# Patient Record
Sex: MALE | Race: WHITE | NOT HISPANIC OR LATINO | Employment: OTHER | URBAN - METROPOLITAN AREA
[De-identification: names, ages, dates, MRNs, and addresses within clinical notes are randomized per-mention and may not be internally consistent; named-entity substitution may affect disease eponyms.]

---

## 2023-04-24 ENCOUNTER — TELEPHONE (OUTPATIENT)
Dept: PRIMARY CARE | Facility: CLINIC | Age: 68
End: 2023-04-24
Payer: MEDICARE

## 2023-04-24 DIAGNOSIS — I10 PRIMARY HYPERTENSION: Primary | ICD-10-CM

## 2023-04-24 DIAGNOSIS — G47.00 INSOMNIA, UNSPECIFIED TYPE: ICD-10-CM

## 2023-04-24 RX ORDER — AMLODIPINE BESYLATE 10 MG/1
10 TABLET ORAL DAILY
Qty: 90 TABLET | Refills: 3 | Status: SHIPPED | OUTPATIENT
Start: 2023-04-24 | End: 2024-04-11

## 2023-04-24 RX ORDER — TRAZODONE HYDROCHLORIDE 50 MG/1
50 TABLET ORAL NIGHTLY
COMMUNITY
Start: 2019-12-16 | End: 2023-04-24 | Stop reason: SDUPTHER

## 2023-04-24 RX ORDER — AMLODIPINE BESYLATE 10 MG/1
10 TABLET ORAL DAILY
COMMUNITY
Start: 2014-07-18 | End: 2023-04-24 | Stop reason: SDUPTHER

## 2023-04-24 RX ORDER — TRAZODONE HYDROCHLORIDE 50 MG/1
50 TABLET ORAL NIGHTLY
Qty: 90 TABLET | Refills: 3 | Status: SHIPPED | OUTPATIENT
Start: 2023-04-24 | End: 2024-04-11

## 2023-07-17 PROBLEM — M25.512 LEFT SHOULDER PAIN: Status: ACTIVE | Noted: 2023-07-17

## 2023-07-17 PROBLEM — N40.0 PROSTATISM: Status: ACTIVE | Noted: 2023-07-17

## 2023-07-17 PROBLEM — R06.81 APNEA: Status: ACTIVE | Noted: 2023-07-17

## 2023-07-17 PROBLEM — M19.031 ARTHRITIS OF RIGHT WRIST: Status: ACTIVE | Noted: 2023-07-17

## 2023-07-17 PROBLEM — S29.012A STRAIN OF LATISSIMUS DORSI MUSCLE: Status: ACTIVE | Noted: 2023-07-17

## 2023-07-17 PROBLEM — R07.89 ATYPICAL CHEST PAIN: Status: ACTIVE | Noted: 2023-03-20

## 2023-07-17 PROBLEM — E55.9 VITAMIN D DEFICIENCY: Status: ACTIVE | Noted: 2023-07-17

## 2023-07-17 PROBLEM — S46.912A LEFT SHOULDER STRAIN: Status: ACTIVE | Noted: 2023-07-17

## 2023-07-17 PROBLEM — E78.5 HYPERLIPIDEMIA: Status: ACTIVE | Noted: 2023-07-17

## 2023-07-17 PROBLEM — I10 HTN (HYPERTENSION): Status: ACTIVE | Noted: 2023-07-17

## 2023-07-17 PROBLEM — M25.569 KNEE PAIN: Status: ACTIVE | Noted: 2023-07-17

## 2023-07-17 PROBLEM — R42 POSTURAL DIZZINESS WITH PRESYNCOPE: Status: ACTIVE | Noted: 2023-07-17

## 2023-07-17 PROBLEM — F34.1 PRIMARY DYSTHYMIA: Status: ACTIVE | Noted: 2023-07-17

## 2023-07-17 PROBLEM — S76.219A GROIN STRAIN: Status: ACTIVE | Noted: 2023-07-17

## 2023-07-17 PROBLEM — E03.9 HYPOTHYROIDISM: Status: ACTIVE | Noted: 2023-07-17

## 2023-07-17 PROBLEM — E78.5 HLD (HYPERLIPIDEMIA): Status: ACTIVE | Noted: 2023-07-17

## 2023-07-17 PROBLEM — R53.83 FATIGUE: Status: ACTIVE | Noted: 2023-07-17

## 2023-07-17 PROBLEM — S39.91XA INJURY OF GROIN: Status: ACTIVE | Noted: 2023-07-17

## 2023-07-17 PROBLEM — R55 POSTURAL DIZZINESS WITH PRESYNCOPE: Status: ACTIVE | Noted: 2023-07-17

## 2023-07-17 PROBLEM — I10 HYPERTENSION: Status: ACTIVE | Noted: 2023-07-17

## 2023-07-17 PROBLEM — R00.1 SYMPTOMATIC SINUS BRADYCARDIA: Status: ACTIVE | Noted: 2023-07-17

## 2023-07-17 PROBLEM — J38.2 VOCAL CORD NODULE: Status: ACTIVE | Noted: 2023-07-17

## 2023-07-17 PROBLEM — S89.90XA KNEE INJURY: Status: ACTIVE | Noted: 2023-07-17

## 2023-07-17 PROBLEM — G47.00 INSOMNIA: Status: ACTIVE | Noted: 2023-07-17

## 2023-07-17 PROBLEM — R06.81 WITNESSED EPISODE OF APNEA: Status: ACTIVE | Noted: 2023-07-17

## 2023-12-12 ENCOUNTER — APPOINTMENT (OUTPATIENT)
Dept: PRIMARY CARE | Facility: CLINIC | Age: 68
End: 2023-12-12
Payer: MEDICARE

## 2023-12-31 DIAGNOSIS — I10 ESSENTIAL (PRIMARY) HYPERTENSION: ICD-10-CM

## 2023-12-31 DIAGNOSIS — E78.5 HYPERLIPIDEMIA, UNSPECIFIED: ICD-10-CM

## 2024-01-03 RX ORDER — SIMVASTATIN 20 MG/1
20 TABLET, FILM COATED ORAL DAILY
Qty: 90 TABLET | Refills: 3 | Status: SHIPPED | OUTPATIENT
Start: 2024-01-03 | End: 2024-05-20 | Stop reason: SDUPTHER

## 2024-01-03 RX ORDER — LOSARTAN POTASSIUM 100 MG/1
100 TABLET ORAL DAILY
Qty: 90 TABLET | Refills: 3 | Status: SHIPPED | OUTPATIENT
Start: 2024-01-03

## 2024-01-14 DIAGNOSIS — F34.1 DYSTHYMIC DISORDER: ICD-10-CM

## 2024-01-15 ENCOUNTER — TELEPHONE (OUTPATIENT)
Dept: PRIMARY CARE | Facility: CLINIC | Age: 69
End: 2024-01-15
Payer: MEDICARE

## 2024-01-15 RX ORDER — PAROXETINE HYDROCHLORIDE 20 MG/1
20 TABLET, FILM COATED ORAL DAILY
Qty: 90 TABLET | Refills: 3 | Status: SHIPPED | OUTPATIENT
Start: 2024-01-15

## 2024-02-15 ENCOUNTER — OFFICE VISIT (OUTPATIENT)
Dept: PRIMARY CARE | Facility: CLINIC | Age: 69
End: 2024-02-15
Payer: MEDICARE

## 2024-02-15 VITALS
BODY MASS INDEX: 28.99 KG/M2 | HEIGHT: 72 IN | HEART RATE: 63 BPM | DIASTOLIC BLOOD PRESSURE: 90 MMHG | RESPIRATION RATE: 14 BRPM | SYSTOLIC BLOOD PRESSURE: 160 MMHG | OXYGEN SATURATION: 98 % | WEIGHT: 214 LBS

## 2024-02-15 DIAGNOSIS — Z23 NEED FOR VACCINATION: ICD-10-CM

## 2024-02-15 DIAGNOSIS — Z00.00 ROUTINE GENERAL MEDICAL EXAMINATION AT HEALTH CARE FACILITY: ICD-10-CM

## 2024-02-15 DIAGNOSIS — N40.1 BENIGN PROSTATIC HYPERPLASIA WITH NOCTURIA: ICD-10-CM

## 2024-02-15 DIAGNOSIS — E78.5 HYPERLIPIDEMIA, UNSPECIFIED HYPERLIPIDEMIA TYPE: ICD-10-CM

## 2024-02-15 DIAGNOSIS — I10 PRIMARY HYPERTENSION: ICD-10-CM

## 2024-02-15 DIAGNOSIS — R35.1 BENIGN PROSTATIC HYPERPLASIA WITH NOCTURIA: ICD-10-CM

## 2024-02-15 DIAGNOSIS — E55.9 VITAMIN D DEFICIENCY: ICD-10-CM

## 2024-02-15 DIAGNOSIS — R19.4 ALTERED BOWEL HABITS: ICD-10-CM

## 2024-02-15 DIAGNOSIS — E03.9 ACQUIRED HYPOTHYROIDISM: ICD-10-CM

## 2024-02-15 DIAGNOSIS — Z00.00 MEDICARE ANNUAL WELLNESS VISIT, SUBSEQUENT: Primary | ICD-10-CM

## 2024-02-15 DIAGNOSIS — R35.1 NOCTURIA: ICD-10-CM

## 2024-02-15 PROCEDURE — 1170F FXNL STATUS ASSESSED: CPT | Performed by: INTERNAL MEDICINE

## 2024-02-15 PROCEDURE — G0439 PPPS, SUBSEQ VISIT: HCPCS | Performed by: INTERNAL MEDICINE

## 2024-02-15 PROCEDURE — 99213 OFFICE O/P EST LOW 20 MIN: CPT | Performed by: INTERNAL MEDICINE

## 2024-02-15 PROCEDURE — 3079F DIAST BP 80-89 MM HG: CPT | Performed by: INTERNAL MEDICINE

## 2024-02-15 PROCEDURE — 90677 PCV20 VACCINE IM: CPT | Performed by: INTERNAL MEDICINE

## 2024-02-15 PROCEDURE — 1036F TOBACCO NON-USER: CPT | Performed by: INTERNAL MEDICINE

## 2024-02-15 PROCEDURE — 1124F ACP DISCUSS-NO DSCNMKR DOCD: CPT | Performed by: INTERNAL MEDICINE

## 2024-02-15 PROCEDURE — 3077F SYST BP >= 140 MM HG: CPT | Performed by: INTERNAL MEDICINE

## 2024-02-15 PROCEDURE — G0009 ADMIN PNEUMOCOCCAL VACCINE: HCPCS | Performed by: INTERNAL MEDICINE

## 2024-02-15 PROCEDURE — 1159F MED LIST DOCD IN RCRD: CPT | Performed by: INTERNAL MEDICINE

## 2024-02-15 RX ORDER — TAMSULOSIN HYDROCHLORIDE 0.4 MG/1
0.4 CAPSULE ORAL DAILY
Qty: 90 CAPSULE | Refills: 3 | Status: SHIPPED | OUTPATIENT
Start: 2024-02-15 | End: 2025-02-14

## 2024-02-15 RX ORDER — HYDROCHLOROTHIAZIDE 12.5 MG/1
12.5 TABLET ORAL DAILY
Qty: 90 TABLET | Refills: 3 | Status: SHIPPED | OUTPATIENT
Start: 2024-02-15 | End: 2025-02-14

## 2024-02-15 ASSESSMENT — ENCOUNTER SYMPTOMS
DIARRHEA: 0
JOINT SWELLING: 0
MYALGIAS: 0
NAUSEA: 0
FEVER: 0
CHILLS: 0
VOMITING: 0
COUGH: 0
DIAPHORESIS: 0
FREQUENCY: 1
SHORTNESS OF BREATH: 0
CONSTIPATION: 0

## 2024-02-15 ASSESSMENT — ACTIVITIES OF DAILY LIVING (ADL)
DOING_HOUSEWORK: INDEPENDENT
DRESSING: INDEPENDENT
BATHING: INDEPENDENT
GROCERY_SHOPPING: INDEPENDENT
TAKING_MEDICATION: INDEPENDENT
MANAGING_FINANCES: INDEPENDENT

## 2024-02-15 ASSESSMENT — PATIENT HEALTH QUESTIONNAIRE - PHQ9
1. LITTLE INTEREST OR PLEASURE IN DOING THINGS: NOT AT ALL
2. FEELING DOWN, DEPRESSED OR HOPELESS: NOT AT ALL
SUM OF ALL RESPONSES TO PHQ9 QUESTIONS 1 AND 2: 0

## 2024-02-15 NOTE — PATIENT INSTRUCTIONS
FASTING LABS ARE ORDERED FOR YOU TO INCLUDE PROSTATE, THYROID.  WAIT FOR 1 MONTH AND GET AFTER YOU'VE BEEN TAKING THE NEW WATER PILL IN THE MORNING    2.  REFERRAL TO GI TO DISCUSS COLONOSCOPY EARLIER SINCE YOU ARE HAVING SOME ALTERED BOWEL HABIT.  COLONOSCOPY FROM 2020 WAS BENIGN    3.  THE HOARSENESS OF VOICE IS USUALLY DUE TO VIRAL INFECTION, AS LONG AS IT IS RESOLVING, THEN  NO FURTHER WORKUP IS REQUIRED.    4.  RECOMMEND START HCTZ 12.5 MG DAILY - LOW DOSE - TAKE IN THE MORNING TO HELP BETTER CONTROL BLOOD PRESSURE.    5.  RECOMMEND START TAMSULOSIN 0.4 MG NIGHTLY TO HELP EASE UP PROSTATE AND RELIEVE NIGHT URINATION.  IF THIS MEDICATION DOESN'T HELP, OR IF PSA LEVEL IS ELEVATED, THEN I WILL GET YOU TO SEE A UROLOGIST FOR A CLOSER CHECK    6.  FOLLOW UP 3-4 MONTHS BP CHECK    7.  PREVNAR -20 PNEUMONIA IMMUNIZATION IS ADMINISTERED TO YOU TODAY TO GET AT LOCAL PHARMACY

## 2024-02-15 NOTE — PROGRESS NOTES
Subjective   Reason for Visit: Gen Govea is an 68 y.o. male here for a Medicare Wellness visit.    WOULD LIKE REFERRAL FOR COLONOSCOPY, PROSTATE CHECK ,THYROID     Past Medical, Surgical, and Family History reviewed and updated in chart.    Reviewed all medications by prescribing practitioner or clinical pharmacist (such as prescriptions, OTCs, herbal therapies and supplements) and documented in the medical record.    HPI  HAVING SOME ISSUES WITH PROSTATE URINATE 5 TIMES A NIGHT, NEVER STARTED TAMSULOSIN    FOR OVER A MONTH JANUARY HAD HOARSENESS OF VOICE, BETTER NOW, HAVING A LOT OF PHLEGM    SAW DR. EARL BEFORE, HAD CT CORONARY ART CALCIUM SCORE 88    TAKING BP MEDS REGULARLY     NO OTHER SYMPTOMS OR COMPLAINTS    BOWELS HABITS HAVE CHANGED, CONCERNED ABOUT COLON CANCER, WANT COLONOSCOPY NOW EVEN THOUGH LAST WAS 2020 WITH BENIGN PATH, AND REC WAS FOR 2025    Patient Care Team:  Clinton Padilla MD as PCP - General (Internal Medicine)  Clinton Padilla MD as PCP - [employer] PCP     Review of Systems   Constitutional:  Negative for chills, diaphoresis and fever.   Respiratory:  Negative for cough and shortness of breath.    Cardiovascular:  Negative for chest pain and leg swelling.   Gastrointestinal:  Negative for constipation, diarrhea, nausea and vomiting.   Genitourinary:  Positive for frequency.   Musculoskeletal:  Negative for joint swelling and myalgias.       Objective   Vitals:  /90   Pulse 63   Resp 14   Ht 1.829 m (6')   Wt 97.1 kg (214 lb)   SpO2 98%   BMI 29.02 kg/m²       Physical Exam  Vitals reviewed.   Constitutional:       General: He is not in acute distress.     Appearance: He is not ill-appearing.   Cardiovascular:      Rate and Rhythm: Normal rate and regular rhythm.      Pulses: Normal pulses.      Heart sounds:      No gallop.   Pulmonary:      Breath sounds: Normal breath sounds. No wheezing, rhonchi or rales.   Abdominal:      General: Abdomen is  flat. Bowel sounds are normal.      Palpations: Abdomen is soft.      Tenderness: There is no guarding or rebound.   Musculoskeletal:      Right lower leg: No edema.      Left lower leg: No edema.         Assessment/Plan   Problem List Items Addressed This Visit       Hyperlipidemia    Relevant Orders    Vitamin B12    Lipid Panel    Comprehensive Metabolic Panel    CBC    HTN (hypertension)    Relevant Medications    hydroCHLOROthiazide (HYDRODiuril) 12.5 mg tablet    Other Relevant Orders    Protein, Urine Random    Hypothyroidism    Relevant Orders    TSH with reflex to Free T4 if abnormal    Vitamin D deficiency    Relevant Orders    Vitamin D 25-Hydroxy,Total (for eval of Vitamin D levels)    Medicare annual wellness visit, subsequent - Primary     Other Visit Diagnoses       Benign prostatic hyperplasia with nocturia        Relevant Medications    tamsulosin (Flomax) 0.4 mg 24 hr capsule    Nocturia        Relevant Orders    Prostate Specific Antigen, Screen    Need for vaccination        Relevant Medications    zoster vaccine-recombinant adjuvanted (Shingrix) 50 mcg/0.5 mL vaccine    Other Relevant Orders    Pneumococcal conjugate vaccine 20-valent IM (Completed)    Routine general medical examination at health care facility        Altered bowel habits        Relevant Orders    Referral to Gastroenterology          Patient Instructions    FASTING LABS ARE ORDERED FOR YOU TO INCLUDE PROSTATE, THYROID.  WAIT FOR 1 MONTH AND GET AFTER YOU'VE BEEN TAKING THE NEW WATER PILL IN THE MORNING    2.  REFERRAL TO GI TO DISCUSS COLONOSCOPY EARLIER SINCE YOU ARE HAVING SOME ALTERED BOWEL HABIT.  COLONOSCOPY FROM 2020 WAS BENIGN    3.  THE HOARSENESS OF VOICE IS USUALLY DUE TO VIRAL INFECTION, AS LONG AS IT IS RESOLVING, THEN  NO FURTHER WORKUP IS REQUIRED.    4.  RECOMMEND START HCTZ 12.5 MG DAILY - LOW DOSE - TAKE IN THE MORNING TO HELP BETTER CONTROL BLOOD PRESSURE.    5.  RECOMMEND START TAMSULOSIN 0.4 MG NIGHTLY TO  HELP EASE UP PROSTATE AND RELIEVE NIGHT URINATION.  IF THIS MEDICATION DOESN'T HELP, OR IF PSA LEVEL IS ELEVATED, THEN I WILL GET YOU TO SEE A UROLOGIST FOR A CLOSER CHECK    6.  FOLLOW UP 3-4 MONTHS BP CHECK    7.  PREVNAR -20 PNEUMONIA IMMUNIZATION IS ADMINISTERED TO YOU TODAY TO GET AT LOCAL PHARMACY

## 2024-03-12 DIAGNOSIS — E03.9 HYPOTHYROIDISM, UNSPECIFIED: ICD-10-CM

## 2024-03-12 RX ORDER — LEVOTHYROXINE SODIUM 125 UG/1
125 TABLET ORAL DAILY
Qty: 90 TABLET | Refills: 1 | Status: SHIPPED | OUTPATIENT
Start: 2024-03-12

## 2024-03-16 ENCOUNTER — LAB (OUTPATIENT)
Dept: LAB | Facility: LAB | Age: 69
End: 2024-03-16
Payer: MEDICARE

## 2024-03-16 DIAGNOSIS — E78.5 HYPERLIPIDEMIA, UNSPECIFIED HYPERLIPIDEMIA TYPE: ICD-10-CM

## 2024-03-16 DIAGNOSIS — E03.9 ACQUIRED HYPOTHYROIDISM: ICD-10-CM

## 2024-03-16 DIAGNOSIS — R35.1 NOCTURIA: ICD-10-CM

## 2024-03-16 DIAGNOSIS — I10 PRIMARY HYPERTENSION: ICD-10-CM

## 2024-03-16 LAB
ALBUMIN SERPL BCP-MCNC: 4.2 G/DL (ref 3.4–5)
ALP SERPL-CCNC: 60 U/L (ref 33–136)
ALT SERPL W P-5'-P-CCNC: 20 U/L (ref 10–52)
ANION GAP SERPL CALC-SCNC: 8 MMOL/L (ref 10–20)
AST SERPL W P-5'-P-CCNC: 19 U/L (ref 9–39)
BILIRUB SERPL-MCNC: 0.7 MG/DL (ref 0–1.2)
BUN SERPL-MCNC: 21 MG/DL (ref 6–23)
CALCIUM SERPL-MCNC: 9.2 MG/DL (ref 8.6–10.3)
CHLORIDE SERPL-SCNC: 108 MMOL/L (ref 98–107)
CHOLEST SERPL-MCNC: 171 MG/DL (ref 0–199)
CHOLESTEROL/HDL RATIO: 3.6
CO2 SERPL-SCNC: 31 MMOL/L (ref 21–32)
CREAT SERPL-MCNC: 0.83 MG/DL (ref 0.5–1.3)
CREAT UR-MCNC: 139.8 MG/DL (ref 20–370)
EGFRCR SERPLBLD CKD-EPI 2021: >90 ML/MIN/1.73M*2
ERYTHROCYTE [DISTWIDTH] IN BLOOD BY AUTOMATED COUNT: 13 % (ref 11.5–14.5)
GLUCOSE SERPL-MCNC: 103 MG/DL (ref 74–99)
HCT VFR BLD AUTO: 42.6 % (ref 41–52)
HDLC SERPL-MCNC: 47.1 MG/DL
HGB BLD-MCNC: 14 G/DL (ref 13.5–17.5)
LDLC SERPL CALC-MCNC: 109 MG/DL
MCH RBC QN AUTO: 28.6 PG (ref 26–34)
MCHC RBC AUTO-ENTMCNC: 32.9 G/DL (ref 32–36)
MCV RBC AUTO: 87 FL (ref 80–100)
NON HDL CHOLESTEROL: 124 MG/DL (ref 0–149)
NRBC BLD-RTO: 0 /100 WBCS (ref 0–0)
PLATELET # BLD AUTO: 313 X10*3/UL (ref 150–450)
POTASSIUM SERPL-SCNC: 4.2 MMOL/L (ref 3.5–5.3)
PROT SERPL-MCNC: 6.9 G/DL (ref 6.4–8.2)
PROT UR-ACNC: 27 MG/DL (ref 5–25)
PROT/CREAT UR: 0.19 MG/MG CREAT (ref 0–0.17)
PSA SERPL-MCNC: 0.49 NG/ML
RBC # BLD AUTO: 4.89 X10*6/UL (ref 4.5–5.9)
SODIUM SERPL-SCNC: 143 MMOL/L (ref 136–145)
TRIGL SERPL-MCNC: 77 MG/DL (ref 0–149)
TSH SERPL-ACNC: 2.14 MIU/L (ref 0.44–3.98)
VIT B12 SERPL-MCNC: 244 PG/ML (ref 211–911)
VLDL: 15 MG/DL (ref 0–40)
WBC # BLD AUTO: 4.9 X10*3/UL (ref 4.4–11.3)

## 2024-03-16 PROCEDURE — 85027 COMPLETE CBC AUTOMATED: CPT

## 2024-03-16 PROCEDURE — 36415 COLL VENOUS BLD VENIPUNCTURE: CPT

## 2024-03-16 PROCEDURE — 84443 ASSAY THYROID STIM HORMONE: CPT

## 2024-03-16 PROCEDURE — 84153 ASSAY OF PSA TOTAL: CPT

## 2024-03-16 PROCEDURE — 84156 ASSAY OF PROTEIN URINE: CPT

## 2024-03-16 PROCEDURE — 82607 VITAMIN B-12: CPT

## 2024-03-16 PROCEDURE — 80061 LIPID PANEL: CPT

## 2024-03-16 PROCEDURE — 80053 COMPREHEN METABOLIC PANEL: CPT

## 2024-03-16 PROCEDURE — 82570 ASSAY OF URINE CREATININE: CPT

## 2024-04-11 DIAGNOSIS — G47.00 INSOMNIA, UNSPECIFIED TYPE: ICD-10-CM

## 2024-04-11 DIAGNOSIS — I10 PRIMARY HYPERTENSION: ICD-10-CM

## 2024-04-11 RX ORDER — AMLODIPINE BESYLATE 10 MG/1
10 TABLET ORAL DAILY
Qty: 90 TABLET | Refills: 3 | Status: SHIPPED | OUTPATIENT
Start: 2024-04-11

## 2024-04-11 RX ORDER — TRAZODONE HYDROCHLORIDE 50 MG/1
50 TABLET ORAL NIGHTLY
Qty: 90 TABLET | Refills: 3 | Status: SHIPPED | OUTPATIENT
Start: 2024-04-11

## 2024-04-23 ENCOUNTER — APPOINTMENT (OUTPATIENT)
Dept: GASTROENTEROLOGY | Facility: CLINIC | Age: 69
End: 2024-04-23
Payer: MEDICARE

## 2024-05-15 ENCOUNTER — OFFICE VISIT (OUTPATIENT)
Dept: PRIMARY CARE | Facility: CLINIC | Age: 69
End: 2024-05-15
Payer: MEDICARE

## 2024-05-15 VITALS
DIASTOLIC BLOOD PRESSURE: 76 MMHG | WEIGHT: 203 LBS | SYSTOLIC BLOOD PRESSURE: 160 MMHG | HEIGHT: 72 IN | OXYGEN SATURATION: 95 % | HEART RATE: 66 BPM | RESPIRATION RATE: 14 BRPM | BODY MASS INDEX: 27.5 KG/M2

## 2024-05-15 DIAGNOSIS — Z87.891 FORMER SMOKER: ICD-10-CM

## 2024-05-15 DIAGNOSIS — E78.2 MIXED HYPERLIPIDEMIA: ICD-10-CM

## 2024-05-15 DIAGNOSIS — R07.9 CHEST PAIN, UNSPECIFIED TYPE: Primary | ICD-10-CM

## 2024-05-15 DIAGNOSIS — I10 PRIMARY HYPERTENSION: ICD-10-CM

## 2024-05-15 DIAGNOSIS — I10 HYPERTENSION, UNSPECIFIED TYPE: ICD-10-CM

## 2024-05-15 PROCEDURE — 93000 ELECTROCARDIOGRAM COMPLETE: CPT | Performed by: INTERNAL MEDICINE

## 2024-05-15 PROCEDURE — 1036F TOBACCO NON-USER: CPT | Performed by: INTERNAL MEDICINE

## 2024-05-15 PROCEDURE — 99214 OFFICE O/P EST MOD 30 MIN: CPT | Performed by: INTERNAL MEDICINE

## 2024-05-15 PROCEDURE — 1159F MED LIST DOCD IN RCRD: CPT | Performed by: INTERNAL MEDICINE

## 2024-05-15 PROCEDURE — 3077F SYST BP >= 140 MM HG: CPT | Performed by: INTERNAL MEDICINE

## 2024-05-15 PROCEDURE — 3078F DIAST BP <80 MM HG: CPT | Performed by: INTERNAL MEDICINE

## 2024-05-15 RX ORDER — CARVEDILOL 3.12 MG/1
3.12 TABLET ORAL
Qty: 60 TABLET | Refills: 2 | Status: SHIPPED | OUTPATIENT
Start: 2024-05-15

## 2024-05-15 ASSESSMENT — PATIENT HEALTH QUESTIONNAIRE - PHQ9
SUM OF ALL RESPONSES TO PHQ9 QUESTIONS 1 AND 2: 0
1. LITTLE INTEREST OR PLEASURE IN DOING THINGS: NOT AT ALL
2. FEELING DOWN, DEPRESSED OR HOPELESS: NOT AT ALL

## 2024-05-15 NOTE — PROGRESS NOTES
Subjective   Gen Govea is a 68 y.o. male who presents for  FOLLOW UP BP    SAYS HE HAS BEEN GETTING CHEST PAIN WANTS REFERRAL TO CARDIOLOGIST     HPI   GETTING MINUTE CHEST PAINS.  GOT A SENSATION OF NOT FEELING WELL.  COMES AND GOES, HAD IT YESTERDAY.  LASTS VARIOUS TIMES, SOMETIMES HALF HOUR, OTHER TIMES AN HOUR.  KIND OF LIKE LEFT SIDE, SOMETIMES ARM FEELS FUNNY.  SHORTNESS OF BREATH AND REAL FATIGUED.  DISCOMFORT NOT MAKE HIM MORE NAUSEATED, SHORT OF BREATH OR SWEATY, JUST REAL FATIGUED.  HAVEN'T TAKEN ANYTHING,  BUT DOES TAKE AN ASPIRIN DAILY.    TAKE BP 2-3 TIMES A WEEK AND ITS BEEN HIGH, THEN COMES /85    TAKING THE NEW DIURETIC AND OTHER TWO BP MEDS FAITHFULLY.      Review of Systems   Constitutional:  Negative for chills, diaphoresis and fever.   Respiratory:  Negative for cough and shortness of breath.    Cardiovascular:  Positive for chest pain. Negative for leg swelling.   Gastrointestinal:  Negative for constipation, diarrhea, nausea and vomiting.   Musculoskeletal:  Negative for joint swelling and myalgias.       Objective   /76   Pulse 66   Resp 14   Ht 1.829 m (6')   Wt 92.1 kg (203 lb)   SpO2 95%   BMI 27.53 kg/m²     Physical Exam  Vitals reviewed.   Constitutional:       General: He is not in acute distress.     Appearance: He is not ill-appearing.   Cardiovascular:      Rate and Rhythm: Normal rate and regular rhythm.      Pulses: Normal pulses.      Heart sounds:      No gallop.   Pulmonary:      Breath sounds: Normal breath sounds. No wheezing, rhonchi or rales.   Abdominal:      General: Abdomen is flat. Bowel sounds are normal.      Palpations: Abdomen is soft.      Tenderness: There is no guarding or rebound.   Musculoskeletal:      Right lower leg: No edema.      Left lower leg: No edema.         Assessment/Plan   Problem List Items Addressed This Visit       Chest pain - Primary    Relevant Orders    ECG 12 Lead    Referral to Cardiology    Hyperlipidemia     RESOLVED: HTN (hypertension)    Relevant Medications    carvedilol (Coreg) 3.125 mg tablet    Other Relevant Orders    ECG 12 Lead    Referral to Cardiology    Hypertension    Relevant Orders    ECG 12 Lead    Referral to Cardiology    Former smoker     Patient Instructions    WILL ADD LOW DOSE CARVEDILOL TWICE DAILY TO TAKE IN ADDITION TO LOSARTAN, AMLODIPINE, AND HYDROCHLOROTHIAZIDE TO ASSIST IN BETTER BP CONTROL AND SEE IF IT WILL HELP KEEP SOME OF THE CHEST DISCOMFORT AWAY    2.  THE SYMPTOMS YOU ARE DESCRIBING ARE SUSPICIOUS, SO IF YOU HAVE PAIN THAT IS WORSENING, MAKING YOU SWETAY, NAUSEATED, AND SHORT OF BREATH, THEN [facility]    3.  REFER TO CARDIOLOGY FOR FURTHER TESTING    4.  PLEASE CALL IF REFILLS NEEDED    5.  EKG WILL BE CHECKED AND IF IT LOOKS OK, WILL LET YOU GO HOME AND START NEW BP MED AS WE AWAIT YOU SEEING THE HEART DOCTOR.    6.  FOLLOW UP FOR BP CHECK AFTER HEART EVAL 2-3 MONTHS OR AS NEEDED.

## 2024-05-15 NOTE — PATIENT INSTRUCTIONS
WILL ADD LOW DOSE CARVEDILOL TWICE DAILY TO TAKE IN ADDITION TO LOSARTAN, AMLODIPINE, AND HYDROCHLOROTHIAZIDE TO ASSIST IN BETTER BP CONTROL AND SEE IF IT WILL HELP KEEP SOME OF THE CHEST DISCOMFORT AWAY    2.  THE SYMPTOMS YOU ARE DESCRIBING ARE SUSPICIOUS, SO IF YOU HAVE PAIN THAT IS WORSENING, MAKING YOU SWETAY, NAUSEATED, AND SHORT OF BREATH, THEN [facility]    3.  REFER TO CARDIOLOGY FOR FURTHER TESTING    4.  PLEASE CALL IF REFILLS NEEDED    5.  EKG WILL BE CHECKED AND IF IT LOOKS OK, WILL LET YOU GO HOME AND START NEW BP MED AS WE AWAIT YOU SEEING THE HEART DOCTOR.    6.  FOLLOW UP FOR BP CHECK AFTER HEART EVAL 2-3 MONTHS OR AS NEEDED.

## 2024-05-20 ENCOUNTER — OFFICE VISIT (OUTPATIENT)
Dept: CARDIOLOGY | Facility: CLINIC | Age: 69
End: 2024-05-20
Payer: MEDICARE

## 2024-05-20 VITALS
DIASTOLIC BLOOD PRESSURE: 80 MMHG | WEIGHT: 205.1 LBS | HEIGHT: 70 IN | BODY MASS INDEX: 29.36 KG/M2 | SYSTOLIC BLOOD PRESSURE: 138 MMHG | HEART RATE: 53 BPM

## 2024-05-20 DIAGNOSIS — Z01.818 PRE-OP TESTING: ICD-10-CM

## 2024-05-20 DIAGNOSIS — Z76.89 ESTABLISHING CARE WITH NEW DOCTOR, ENCOUNTER FOR: ICD-10-CM

## 2024-05-20 DIAGNOSIS — Z87.891 FORMER SMOKER: ICD-10-CM

## 2024-05-20 DIAGNOSIS — I20.0 ANGINA PECTORIS, UNSTABLE (MULTI): ICD-10-CM

## 2024-05-20 DIAGNOSIS — E78.5 HYPERLIPIDEMIA, UNSPECIFIED: ICD-10-CM

## 2024-05-20 DIAGNOSIS — I10 HYPERTENSION, UNSPECIFIED TYPE: ICD-10-CM

## 2024-05-20 DIAGNOSIS — E78.2 MIXED HYPERLIPIDEMIA: ICD-10-CM

## 2024-05-20 DIAGNOSIS — R01.1 SYSTOLIC MURMUR: ICD-10-CM

## 2024-05-20 DIAGNOSIS — R07.9 CHEST PAIN, UNSPECIFIED TYPE: ICD-10-CM

## 2024-05-20 PROCEDURE — 1036F TOBACCO NON-USER: CPT | Performed by: INTERNAL MEDICINE

## 2024-05-20 PROCEDURE — 3079F DIAST BP 80-89 MM HG: CPT | Performed by: INTERNAL MEDICINE

## 2024-05-20 PROCEDURE — 99214 OFFICE O/P EST MOD 30 MIN: CPT | Performed by: INTERNAL MEDICINE

## 2024-05-20 PROCEDURE — 1159F MED LIST DOCD IN RCRD: CPT | Performed by: INTERNAL MEDICINE

## 2024-05-20 PROCEDURE — 3075F SYST BP GE 130 - 139MM HG: CPT | Performed by: INTERNAL MEDICINE

## 2024-05-20 PROCEDURE — 3008F BODY MASS INDEX DOCD: CPT | Performed by: INTERNAL MEDICINE

## 2024-05-20 PROCEDURE — 93000 ELECTROCARDIOGRAM COMPLETE: CPT | Performed by: INTERNAL MEDICINE

## 2024-05-20 RX ORDER — SIMVASTATIN 40 MG/1
40 TABLET, FILM COATED ORAL DAILY
Qty: 90 TABLET | Refills: 3 | Status: SHIPPED | OUTPATIENT
Start: 2024-05-20 | End: 2025-05-20

## 2024-05-20 ASSESSMENT — ENCOUNTER SYMPTOMS
DIARRHEA: 0
CHILLS: 0
FEVER: 0
CONSTIPATION: 0
MYALGIAS: 0
JOINT SWELLING: 0
SHORTNESS OF BREATH: 0
DIAPHORESIS: 0
NAUSEA: 0
VOMITING: 0
COUGH: 0

## 2024-05-20 NOTE — H&P (VIEW-ONLY)
CARDIOLOGY OFFICE VISIT      CHIEF COMPLAINT  Chief Complaint   Patient presents with    New Patient Visit     Per PCP due to recent chest pain and HTN        HISTORY OF PRESENT ILLNESS  Gen Govea is a 68 y.o. year old male patient with a history of hypertension and dyslipidemia who presented with intermittent chest pressure with radiation to the left and sometimes his left arm feels funny.  He has also noticed increased fatigue and shortness of breath with his day-to-day activities.  He had no prior history of coronary artery disease but had calcium CT scoring back in March 2023 which showed a total calcium score of 88 with most of it in the left main at 77 with 0 calcium reported in the LAD.  He had a remote history of smoking but still smoking for several years.  The patient is currently chest pain-free.  He denies any family history of premature coronary artery disease or sudden cardiac death.  His blood pressure has been improved since HCTZ was added to his other blood pressure medications.  Labs from March 2024 shows total cholesterol is 171, HDL is 47, LDL is 109 and triglyceride is 77  EKG today shows sinus bradycardia at 53 bpm with no acute ST or T wave changes.    ASSESSMENT AND PLAN  1.  Chest pain: Chest pain is suggestive of possible angina, associated with dyspnea with exertion in patient with multiple risk factors including hypertension, dyslipidemia as well as abnormal calcium score as noted above.  I will recommend proceeding with cardiac catheterization since most of the calcium buildup was in the left main coronary artery.  In the meantime, we will continue with aspirin and his other blood pressure medications.  2.  Hypertension: Blood pressure is fairly well-controlled on his current medications which we will continue.  3.  Dyslipidemia: Lipids is not at goal, we have increased his Zocor to 40 mg daily.    Diagnoses and all orders for this visit:  Chest pain, unspecified type  -      Referral to Cardiology  Establishing care with new doctor, encounter for  Hypertension, unspecified type  Mixed hyperlipidemia  Systolic murmur  BMI 29.0-29.9,adult  Former smoker  Hyperlipidemia, unspecified  Angina pectoris, unstable (Multi)  Pre-op testing      Recent Cardiovascular Testing:    Echo-  Stress-  Cath-  Carotid Ultrasound-    Past Medical History  No past medical history on file.    Social History  Social History     Tobacco Use    Smoking status: Former     Current packs/day: 0.00     Types: Cigarettes     Quit date:      Years since quittin.4    Smokeless tobacco: Never   Substance Use Topics    Alcohol use: Yes     Alcohol/week: 2.0 - 3.0 standard drinks of alcohol     Types: 2 - 3 Cans of beer per week     Comment: 1x a week    Drug use: Not Currently       Family History     Family History   Problem Relation Name Age of Onset    Alcohol abuse Mother      Cirrhosis Mother      Pancreatitis Father          Allergies:  No Known Allergies     Outpatient Medications:  Current Outpatient Medications   Medication Instructions    amLODIPine (NORVASC) 10 mg, oral, Daily    aspirin 81 mg EC tablet 1 tablet, oral, Daily    carvedilol (COREG) 3.125 mg, oral, 2 times daily (morning and late afternoon)    cholecalciferol (Vitamin D-3) 50 mcg (2,000 unit) capsule 1 capsule, oral, Daily    hydroCHLOROthiazide (MICROZIDE) 12.5 mg, oral, Daily    levothyroxine (SYNTHROID, LEVOXYL) 125 mcg, oral, Daily    losartan (COZAAR) 100 mg, oral, Daily    PARoxetine (PAXIL) 20 mg, oral, Daily    simvastatin (ZOCOR) 20 mg, oral, Daily    tamsulosin (FLOMAX) 0.4 mg, oral, Daily    traZODone (DESYREL) 50 mg, oral, Nightly        Recent Lab Results:    CBC:   Lab Results   Component Value Date    WBC 4.9 2024    RBC 4.89 2024    HGB 14.0 2024    HCT 42.6 2024     2024        CMP:    Lab Results   Component Value Date     2024    K 4.2 2024     (H) 2024  "   CO2 31 03/16/2024    BUN 21 03/16/2024    CREATININE 0.83 03/16/2024    GLUCOSE 103 (H) 03/16/2024    CALCIUM 9.2 03/16/2024       Magnesium:    No results found for: \"MG\"    Lipid Profile:    Lab Results   Component Value Date    TRIG 77 03/16/2024    HDL 47.1 03/16/2024    LDLCALC 109 (H) 03/16/2024       TSH:    Lab Results   Component Value Date    TSH 2.14 03/16/2024       BNP:   No results found for: \"BNP\"     PT/INR:    No results found for: \"PROTIME\", \"INR\"    HgBA1c:    No results found for: \"HGBA1C\"    BMP:  Lab Results   Component Value Date     03/16/2024     01/28/2023     09/09/2021     12/17/2019    K 4.2 03/16/2024    K 3.8 01/28/2023    K 3.9 09/09/2021    K 3.9 12/17/2019     (H) 03/16/2024     01/28/2023     09/09/2021     12/17/2019    CO2 31 03/16/2024    CO2 29 01/28/2023    CO2 27 09/09/2021    CO2 28 12/17/2019    BUN 21 03/16/2024    BUN 16 01/28/2023    BUN 17 09/09/2021    BUN 15 12/17/2019    CREATININE 0.83 03/16/2024    CREATININE 0.75 01/28/2023    CREATININE 0.76 09/09/2021    CREATININE 0.67 12/17/2019       CBC:  Lab Results   Component Value Date    WBC 4.9 03/16/2024    WBC 5.2 01/28/2023    WBC 5.3 09/09/2021    WBC 4.9 12/17/2019    RBC 4.89 03/16/2024    RBC 5.09 01/28/2023    RBC 5.23 09/09/2021    RBC 5.09 12/17/2019    HGB 14.0 03/16/2024    HGB 14.6 01/28/2023    HGB 15.1 09/09/2021    HGB 14.6 12/17/2019    HCT 42.6 03/16/2024    HCT 44.3 01/28/2023    HCT 46.2 09/09/2021    HCT 44.5 12/17/2019    MCV 87 03/16/2024    MCV 87 01/28/2023    MCV 88 09/09/2021    MCV 87 12/17/2019    MCH 28.6 03/16/2024    MCHC 32.9 03/16/2024    MCHC 33.0 01/28/2023    MCHC 32.7 09/09/2021    MCHC 32.8 12/17/2019    RDW 13.0 03/16/2024    RDW 12.5 01/28/2023    RDW 12.6 09/09/2021    RDW 12.4 12/17/2019     03/16/2024     01/28/2023     09/09/2021     12/17/2019       Cardiac Enzymes:    No results found for: " "\"TROPHS\"    Hepatic Function Panel:    Lab Results   Component Value Date    ALKPHOS 60 03/16/2024    ALT 20 03/16/2024    AST 19 03/16/2024    PROT 6.9 03/16/2024    BILITOT 0.7 03/16/2024         REVIEW OF SYSTEMS  Review of Systems   Cardiovascular:  Positive for chest pain.   All other systems reviewed and are negative.      VITALS  Vitals:    05/20/24 0837   BP: 138/80   Pulse:      Wt Readings from Last 4 Encounters:   05/20/24 93 kg (205 lb 1.6 oz)   05/15/24 92.1 kg (203 lb)   02/15/24 97.1 kg (214 lb)   03/06/23 95.3 kg (210 lb)       PHYSICAL EXAM  Constitutional:       Appearance: Healthy appearance. Not in distress.   Eyes:      Conjunctiva/sclera: Conjunctivae normal.      Pupils: Pupils are equal, round, and reactive to light.   Neck:      Vascular: No JVR. JVD normal.   Pulmonary:      Effort: Pulmonary effort is normal.      Breath sounds: Normal breath sounds. No wheezing. No rhonchi. No rales.   Chest:      Chest wall: Not tender to palpatation.   Cardiovascular:      PMI at left midclavicular line. Normal rate. Regular rhythm. Normal S1. Normal S2.       Murmurs: There is a grade 1/6 systolic murmur at the URSB.      No gallop.  No click. No rub.   Pulses:     Intact distal pulses.   Edema:     Peripheral edema absent.   Abdominal:      Tenderness: There is no abdominal tenderness.   Musculoskeletal: Normal range of motion.         General: No tenderness.      Cervical back: Normal range of motion. Skin:     General: Skin is warm and dry.   Neurological:      General: No focal deficit present.      Mental Status: Alert and oriented to person, place and time.             "

## 2024-05-20 NOTE — PATIENT INSTRUCTIONS
Please take your Amlodipine and Hydrochlorothiazide in the morning and your Losartan in the evening  Increase your Simvastatin to 40 mg daily.  You will be scheduled for a heart cath  You will have blood work done prior to the heart cath    Continue same medications/treatment.  Patient educated on proper medication use.  Patient educated on risk factor modification.  Please bring any lab results from other providers / physicians to your next appointment.    Please bring all medicines, vitamins and herbal supplements with you when you come to the office.    Prescriptions will not be filled unless you are compliant with your follow up appointments or have a follow up  appointment scheduled as per instruction of your physician.  Refills should be requested at the time of  Your visit.    Kari CLAY LPN, am scribing for and in the presence of  Dr. Jacob James MD

## 2024-05-23 ENCOUNTER — LAB (OUTPATIENT)
Dept: LAB | Facility: LAB | Age: 69
End: 2024-05-23
Payer: MEDICARE

## 2024-05-23 DIAGNOSIS — Z01.818 PRE-OP TESTING: ICD-10-CM

## 2024-05-23 DIAGNOSIS — I20.0 ANGINA PECTORIS, UNSTABLE (MULTI): ICD-10-CM

## 2024-05-23 DIAGNOSIS — R07.9 CHEST PAIN, UNSPECIFIED TYPE: ICD-10-CM

## 2024-05-23 LAB
ANION GAP SERPL CALC-SCNC: 10 MMOL/L (ref 10–20)
BUN SERPL-MCNC: 16 MG/DL (ref 6–23)
CALCIUM SERPL-MCNC: 9.2 MG/DL (ref 8.6–10.3)
CHLORIDE SERPL-SCNC: 103 MMOL/L (ref 98–107)
CO2 SERPL-SCNC: 31 MMOL/L (ref 21–32)
CREAT SERPL-MCNC: 0.65 MG/DL (ref 0.5–1.3)
EGFRCR SERPLBLD CKD-EPI 2021: >90 ML/MIN/1.73M*2
ERYTHROCYTE [DISTWIDTH] IN BLOOD BY AUTOMATED COUNT: 12.5 % (ref 11.5–14.5)
GLUCOSE SERPL-MCNC: 83 MG/DL (ref 74–99)
HCT VFR BLD AUTO: 44.5 % (ref 41–52)
HGB BLD-MCNC: 14.8 G/DL (ref 13.5–17.5)
MCH RBC QN AUTO: 28.8 PG (ref 26–34)
MCHC RBC AUTO-ENTMCNC: 33.3 G/DL (ref 32–36)
MCV RBC AUTO: 87 FL (ref 80–100)
NRBC BLD-RTO: 0 /100 WBCS (ref 0–0)
PLATELET # BLD AUTO: 311 X10*3/UL (ref 150–450)
POTASSIUM SERPL-SCNC: 4 MMOL/L (ref 3.5–5.3)
RBC # BLD AUTO: 5.13 X10*6/UL (ref 4.5–5.9)
SODIUM SERPL-SCNC: 140 MMOL/L (ref 136–145)
WBC # BLD AUTO: 5 X10*3/UL (ref 4.4–11.3)

## 2024-05-23 PROCEDURE — 85027 COMPLETE CBC AUTOMATED: CPT

## 2024-05-23 PROCEDURE — 80048 BASIC METABOLIC PNL TOTAL CA: CPT

## 2024-05-23 PROCEDURE — 36415 COLL VENOUS BLD VENIPUNCTURE: CPT

## 2024-05-23 RX ORDER — ASPIRIN 325 MG
325 TABLET ORAL ONCE
Status: CANCELLED | OUTPATIENT
Start: 2024-05-23 | End: 2024-05-23

## 2024-05-28 ENCOUNTER — APPOINTMENT (OUTPATIENT)
Dept: CARDIOLOGY | Facility: HOSPITAL | Age: 69
End: 2024-05-28
Payer: MEDICARE

## 2024-05-28 ENCOUNTER — HOSPITAL ENCOUNTER (OUTPATIENT)
Facility: HOSPITAL | Age: 69
Setting detail: OUTPATIENT SURGERY
Discharge: HOME | End: 2024-05-28
Attending: INTERNAL MEDICINE | Admitting: INTERNAL MEDICINE
Payer: MEDICARE

## 2024-05-28 VITALS
HEART RATE: 56 BPM | SYSTOLIC BLOOD PRESSURE: 133 MMHG | OXYGEN SATURATION: 97 % | TEMPERATURE: 36.3 F | RESPIRATION RATE: 10 BRPM | DIASTOLIC BLOOD PRESSURE: 75 MMHG

## 2024-05-28 DIAGNOSIS — R07.9 CHEST PAIN, UNSPECIFIED TYPE: Primary | ICD-10-CM

## 2024-05-28 DIAGNOSIS — I20.0 ANGINA PECTORIS, UNSTABLE (MULTI): ICD-10-CM

## 2024-05-28 DIAGNOSIS — I25.119 ATHEROSCLEROTIC HEART DISEASE OF NATIVE CORONARY ARTERY WITH UNSPECIFIED ANGINA PECTORIS (CMS-HCC): ICD-10-CM

## 2024-05-28 PROCEDURE — 99153 MOD SED SAME PHYS/QHP EA: CPT | Performed by: INTERNAL MEDICINE

## 2024-05-28 PROCEDURE — 7100000010 HC PHASE TWO TIME - EACH INCREMENTAL 1 MINUTE: Performed by: INTERNAL MEDICINE

## 2024-05-28 PROCEDURE — 93458 L HRT ARTERY/VENTRICLE ANGIO: CPT | Performed by: INTERNAL MEDICINE

## 2024-05-28 PROCEDURE — 2550000001 HC RX 255 CONTRASTS: Performed by: INTERNAL MEDICINE

## 2024-05-28 PROCEDURE — 2500000004 HC RX 250 GENERAL PHARMACY W/ HCPCS (ALT 636 FOR OP/ED): Performed by: INTERNAL MEDICINE

## 2024-05-28 PROCEDURE — 7100000009 HC PHASE TWO TIME - INITIAL BASE CHARGE: Performed by: INTERNAL MEDICINE

## 2024-05-28 PROCEDURE — 2720000007 HC OR 272 NO HCPCS: Performed by: INTERNAL MEDICINE

## 2024-05-28 PROCEDURE — C1760 CLOSURE DEV, VASC: HCPCS | Performed by: INTERNAL MEDICINE

## 2024-05-28 PROCEDURE — 99152 MOD SED SAME PHYS/QHP 5/>YRS: CPT | Performed by: INTERNAL MEDICINE

## 2024-05-28 PROCEDURE — G0269 OCCLUSIVE DEVICE IN VEIN ART: HCPCS | Mod: TC,59 | Performed by: INTERNAL MEDICINE

## 2024-05-28 PROCEDURE — 2500000005 HC RX 250 GENERAL PHARMACY W/O HCPCS: Performed by: INTERNAL MEDICINE

## 2024-05-28 PROCEDURE — 93005 ELECTROCARDIOGRAM TRACING: CPT | Mod: 59

## 2024-05-28 PROCEDURE — 2780000003 HC OR 278 NO HCPCS: Performed by: INTERNAL MEDICINE

## 2024-05-28 PROCEDURE — 93010 ELECTROCARDIOGRAM REPORT: CPT | Performed by: INTERNAL MEDICINE

## 2024-05-28 PROCEDURE — 2500000004 HC RX 250 GENERAL PHARMACY W/ HCPCS (ALT 636 FOR OP/ED): Performed by: NURSE PRACTITIONER

## 2024-05-28 RX ORDER — LIDOCAINE HYDROCHLORIDE 20 MG/ML
INJECTION, SOLUTION INFILTRATION; PERINEURAL AS NEEDED
Status: DISCONTINUED | OUTPATIENT
Start: 2024-05-28 | End: 2024-05-28 | Stop reason: HOSPADM

## 2024-05-28 RX ORDER — SODIUM CHLORIDE 9 MG/ML
100 INJECTION, SOLUTION INTRAVENOUS CONTINUOUS
Status: ACTIVE | OUTPATIENT
Start: 2024-05-28 | End: 2024-05-28

## 2024-05-28 RX ORDER — SODIUM CHLORIDE 9 MG/ML
100 INJECTION, SOLUTION INTRAVENOUS CONTINUOUS
Status: DISCONTINUED | OUTPATIENT
Start: 2024-05-28 | End: 2024-05-28

## 2024-05-28 RX ORDER — MIDAZOLAM HYDROCHLORIDE 1 MG/ML
INJECTION, SOLUTION INTRAMUSCULAR; INTRAVENOUS AS NEEDED
Status: DISCONTINUED | OUTPATIENT
Start: 2024-05-28 | End: 2024-05-28 | Stop reason: HOSPADM

## 2024-05-28 RX ORDER — FENTANYL CITRATE 50 UG/ML
INJECTION, SOLUTION INTRAMUSCULAR; INTRAVENOUS AS NEEDED
Status: DISCONTINUED | OUTPATIENT
Start: 2024-05-28 | End: 2024-05-28 | Stop reason: HOSPADM

## 2024-05-28 RX ADMIN — SODIUM CHLORIDE 100 ML/HR: 9 INJECTION, SOLUTION INTRAVENOUS at 07:30

## 2024-05-28 ASSESSMENT — COLUMBIA-SUICIDE SEVERITY RATING SCALE - C-SSRS
1. IN THE PAST MONTH, HAVE YOU WISHED YOU WERE DEAD OR WISHED YOU COULD GO TO SLEEP AND NOT WAKE UP?: NO
2. HAVE YOU ACTUALLY HAD ANY THOUGHTS OF KILLING YOURSELF?: NO
6. HAVE YOU EVER DONE ANYTHING, STARTED TO DO ANYTHING, OR PREPARED TO DO ANYTHING TO END YOUR LIFE?: NO

## 2024-05-28 NOTE — PROGRESS NOTES
Patient is stable status post LHC under the care of Dr. Velasquez.  Discussed results of procedure with patient and his wife.  Pictures provided.  Findings of the LHC revealed mild coronary artery disease and an LVEF of 60%.  Medical management is advised.  Please see procedural report for complete details.  Patient was instructed to continue taking aspirin 81 mg daily as well as his statin therapy.  He was also instructed to resume his carvedilol 3.125 mg twice daily as he stopped taking this on his own as he felt it was making him tired.  After further discussion it was noted patient was not taking his thyroid medicine correctly and this may be contributing to his fatigue.  He is now taking his thyroid medication appropriately.  Will reevaluate symptoms and BP in the office in 2 to 3 weeks after resumption of the carvedilol.  Postprocedural activity, restrictions, potential complications, medications and future follow-up discussed at length.  Multiple questions answered.  Both verbalized understanding.

## 2024-05-28 NOTE — NURSING NOTE
Patient states understanding of discharge instructions as given via teach back. Patient medications, care of groin site and follow up appointments reviewed with patient and spouse. No further questions from patient at this time.   0

## 2024-05-28 NOTE — POST-PROCEDURE NOTE
Physician Transition of Care Summary  Invasive Cardiovascular Lab    Procedure Date: 5/28/2024  Attending:    * Gregorio Velasquez - Primary  Resident/Fellow/Other Assistant: Surgeons and Role:  * No surgeons found with a matching role *    Pre Procedure Diagnosis:   CAD, new onset angina pectoris    Post Procedure Diagnosis:   Mild CAD, left ventricular ejection fraction 60%, medical management    Complications:   None    Stents/Implants:   None    Anticoagulation/Antiplatelet Plan:   Low-dose aspirin    Estimated Blood Loss:   0 mL    Electronically signed by: Gregorio Velasquez MD, 5/28/2024 8:31 AM    Anesthesia: Moderate                            anesthesia Staff: None

## 2024-05-31 LAB
ATRIAL RATE: 52 BPM
P AXIS: 44 DEGREES
P OFFSET: 204 MS
P ONSET: 138 MS
PR INTERVAL: 164 MS
Q ONSET: 220 MS
QRS COUNT: 9 BEATS
QRS DURATION: 104 MS
QT INTERVAL: 446 MS
QTC CALCULATION(BAZETT): 414 MS
QTC FREDERICIA: 425 MS
R AXIS: 17 DEGREES
T AXIS: 56 DEGREES
T OFFSET: 443 MS
VENTRICULAR RATE: 52 BPM

## 2024-06-11 DIAGNOSIS — I10 PRIMARY HYPERTENSION: ICD-10-CM

## 2024-06-12 RX ORDER — CARVEDILOL 3.12 MG/1
3.12 TABLET ORAL
Qty: 180 TABLET | Refills: 1 | Status: SHIPPED | OUTPATIENT
Start: 2024-06-12

## 2024-06-25 ENCOUNTER — APPOINTMENT (OUTPATIENT)
Dept: CARDIOLOGY | Facility: CLINIC | Age: 69
End: 2024-06-25
Payer: MEDICARE

## 2024-06-25 VITALS
BODY MASS INDEX: 29.95 KG/M2 | DIASTOLIC BLOOD PRESSURE: 76 MMHG | SYSTOLIC BLOOD PRESSURE: 144 MMHG | WEIGHT: 208.7 LBS | HEART RATE: 56 BPM

## 2024-06-25 DIAGNOSIS — R07.9 CHEST PAIN, UNSPECIFIED TYPE: ICD-10-CM

## 2024-06-25 DIAGNOSIS — I10 PRIMARY HYPERTENSION: ICD-10-CM

## 2024-06-25 DIAGNOSIS — R06.02 SHORTNESS OF BREATH: ICD-10-CM

## 2024-06-25 DIAGNOSIS — Z87.891 FORMER SMOKER: ICD-10-CM

## 2024-06-25 DIAGNOSIS — E78.2 MIXED HYPERLIPIDEMIA: ICD-10-CM

## 2024-06-25 DIAGNOSIS — R00.1 SYMPTOMATIC SINUS BRADYCARDIA: ICD-10-CM

## 2024-06-25 DIAGNOSIS — R53.83 OTHER FATIGUE: ICD-10-CM

## 2024-06-25 PROCEDURE — 3008F BODY MASS INDEX DOCD: CPT | Performed by: INTERNAL MEDICINE

## 2024-06-25 PROCEDURE — 1036F TOBACCO NON-USER: CPT | Performed by: INTERNAL MEDICINE

## 2024-06-25 PROCEDURE — 3078F DIAST BP <80 MM HG: CPT | Performed by: INTERNAL MEDICINE

## 2024-06-25 PROCEDURE — 1159F MED LIST DOCD IN RCRD: CPT | Performed by: INTERNAL MEDICINE

## 2024-06-25 PROCEDURE — 99214 OFFICE O/P EST MOD 30 MIN: CPT | Performed by: INTERNAL MEDICINE

## 2024-06-25 PROCEDURE — 3077F SYST BP >= 140 MM HG: CPT | Performed by: INTERNAL MEDICINE

## 2024-06-25 RX ORDER — VALSARTAN AND HYDROCHLOROTHIAZIDE 320; 12.5 MG/1; MG/1
1 TABLET, FILM COATED ORAL DAILY
Qty: 90 TABLET | Refills: 3 | Status: SHIPPED | OUTPATIENT
Start: 2024-06-25 | End: 2024-06-26 | Stop reason: SDUPTHER

## 2024-06-25 RX ORDER — CARVEDILOL 3.12 MG/1
3.12 TABLET ORAL DAILY
Qty: 1 TABLET | Refills: 0 | OUTPATIENT
Start: 2024-06-25

## 2024-06-25 ASSESSMENT — ENCOUNTER SYMPTOMS
RESPIRATORY NEGATIVE: 1
CARDIOVASCULAR NEGATIVE: 1
CONSTITUTIONAL NEGATIVE: 1
NEUROLOGICAL NEGATIVE: 1

## 2024-06-25 NOTE — PROGRESS NOTES
CARDIOLOGY OFFICE VISIT      CHIEF COMPLAINT  Chief Complaint   Patient presents with    Follow-up      Week follow-up s/p C.  States his blood pressure has been running high & low.  Still c/o SOB        HISTORY OF PRESENT ILLNESS  Gen Govea is a 68 y.o. year old male patient with a history of hypertension, dyslipidemia will presented with intermittent chest pain suggestive of unstable angina for which she had Cardiac catheterization in May 2024 that was normal with normal LV function.  He has been doing well with no recurrent chest pain.  Unfortunately his blood pressure is still suboptimally controlled on his current medications and he has had couple of episodes of superficial bleeding in the right eye when he does some heavy lifting.  There is no blurring of vision or visual impairment.  Advised him to follow-up with an ophthalmologist for evaluation for this.    ASSESSMENT AND PLAN  1.  Chest pain: This is atypical chest pain with normal cardiac catheterization as noted above, patient has been reassured.  2.  Hypertension: Blood pressure is suboptimally controlled on his current medications we will discontinue the losartan and start him on valsartan 320 mg/HCTZ 12.5 mg.  He complains of significant fatigue which is probably secondary to the beta-blockers, so we will reduce carvedilol to 3.125 mg daily and follow-up for repeat blood pressure check.  3.  Dyslipidemia: With acceptable lipid profile, continue with lipid-lowering therapy.  4.  Subconjunctival hemorrhage: Most likely secondary to poorly controlled hypertension, but advised him to follow-up with ophthalmologist for further evaluation.    Problem List Items Addressed This Visit       Chest pain    Fatigue    Hyperlipidemia    Hypertension    Symptomatic sinus bradycardia    BMI 29.0-29.9,adult    Former smoker    Shortness of breath       Recent Cardiovascular Testing:    Echo-  Stress-  Cath-  Carotid Ultrasound-    Past Medical History  Past  "Medical History:   Diagnosis Date    Coronary artery disease     Disease of thyroid gland     Hyperlipidemia     Hypertension        Social History  Social History     Tobacco Use    Smoking status: Former     Current packs/day: 0.00     Types: Cigarettes     Quit date:      Years since quittin.5    Smokeless tobacco: Never   Substance Use Topics    Alcohol use: Yes     Alcohol/week: 2.0 - 3.0 standard drinks of alcohol     Types: 2 - 3 Cans of beer per week     Comment: 1x a week    Drug use: Not Currently       Family History     Family History   Problem Relation Name Age of Onset    Alcohol abuse Mother      Cirrhosis Mother      Pancreatitis Father          Allergies:  No Known Allergies     Outpatient Medications:  Current Outpatient Medications   Medication Instructions    amLODIPine (NORVASC) 10 mg, oral, Daily    aspirin 81 mg EC tablet 1 tablet, oral, Daily    carvedilol (COREG) 3.125 mg, oral, 2 times daily (morning and late afternoon)    cholecalciferol (Vitamin D-3) 50 mcg (2,000 unit) capsule 1 capsule, oral, Daily    levothyroxine (SYNTHROID, LEVOXYL) 125 mcg, oral, Daily    PARoxetine (PAXIL) 20 mg, oral, Daily    simvastatin (ZOCOR) 40 mg, oral, Daily    tamsulosin (FLOMAX) 0.4 mg, oral, Daily    traZODone (DESYREL) 50 mg, oral, Nightly        Recent Lab Results:    CBC:   Lab Results   Component Value Date    WBC 5.0 2024    RBC 5.13 2024    HGB 14.8 2024    HCT 44.5 2024     2024        CMP:    Lab Results   Component Value Date     2024    K 4.0 2024     2024    CO2 31 2024    BUN 16 2024    CREATININE 0.65 2024    GLUCOSE 83 2024    CALCIUM 9.2 2024       Magnesium:    No results found for: \"MG\"    Lipid Profile:    Lab Results   Component Value Date    TRIG 77 2024    HDL 47.1 2024    LDLCALC 109 (H) 2024       TSH:    Lab Results   Component Value Date    TSH 2.14 " "03/16/2024       BNP:   No results found for: \"BNP\"     PT/INR:    No results found for: \"PROTIME\", \"INR\"    HgBA1c:    No results found for: \"HGBA1C\"    BMP:  Lab Results   Component Value Date     05/23/2024     03/16/2024     01/28/2023     09/09/2021     12/17/2019    K 4.0 05/23/2024    K 4.2 03/16/2024    K 3.8 01/28/2023    K 3.9 09/09/2021    K 3.9 12/17/2019     05/23/2024     (H) 03/16/2024     01/28/2023     09/09/2021     12/17/2019    CO2 31 05/23/2024    CO2 31 03/16/2024    CO2 29 01/28/2023    CO2 27 09/09/2021    CO2 28 12/17/2019    BUN 16 05/23/2024    BUN 21 03/16/2024    BUN 16 01/28/2023    BUN 17 09/09/2021    BUN 15 12/17/2019    CREATININE 0.65 05/23/2024    CREATININE 0.83 03/16/2024    CREATININE 0.75 01/28/2023    CREATININE 0.76 09/09/2021    CREATININE 0.67 12/17/2019       CBC:  Lab Results   Component Value Date    WBC 5.0 05/23/2024    WBC 4.9 03/16/2024    WBC 5.2 01/28/2023    WBC 5.3 09/09/2021    WBC 4.9 12/17/2019    RBC 5.13 05/23/2024    RBC 4.89 03/16/2024    RBC 5.09 01/28/2023    RBC 5.23 09/09/2021    RBC 5.09 12/17/2019    HGB 14.8 05/23/2024    HGB 14.0 03/16/2024    HGB 14.6 01/28/2023    HGB 15.1 09/09/2021    HGB 14.6 12/17/2019    HCT 44.5 05/23/2024    HCT 42.6 03/16/2024    HCT 44.3 01/28/2023    HCT 46.2 09/09/2021    HCT 44.5 12/17/2019    MCV 87 05/23/2024    MCV 87 03/16/2024    MCV 87 01/28/2023    MCV 88 09/09/2021    MCV 87 12/17/2019    MCH 28.8 05/23/2024    MCH 28.6 03/16/2024    MCHC 33.3 05/23/2024    MCHC 32.9 03/16/2024    MCHC 33.0 01/28/2023    MCHC 32.7 09/09/2021    MCHC 32.8 12/17/2019    RDW 12.5 05/23/2024    RDW 13.0 03/16/2024    RDW 12.5 01/28/2023    RDW 12.6 09/09/2021    RDW 12.4 12/17/2019     05/23/2024     03/16/2024     01/28/2023     09/09/2021     12/17/2019       Cardiac Enzymes:    No results found for: \"TROPHS\"    Hepatic Function " Panel:    Lab Results   Component Value Date    ALKPHOS 60 03/16/2024    ALT 20 03/16/2024    AST 19 03/16/2024    PROT 6.9 03/16/2024    BILITOT 0.7 03/16/2024         REVIEW OF SYSTEMS  Review of Systems   Constitutional: Negative.   Cardiovascular: Negative.    Respiratory: Negative.     Neurological: Negative.    All other systems reviewed and are negative.      VITALS  Vitals:    06/25/24 1144   BP: 144/76   Pulse: 56     Wt Readings from Last 4 Encounters:   06/25/24 94.7 kg (208 lb 11.2 oz)   05/20/24 93 kg (205 lb 1.6 oz)   05/15/24 92.1 kg (203 lb)   02/15/24 97.1 kg (214 lb)       PHYSICAL EXAM  Constitutional:       Appearance: Healthy appearance.   Eyes:      Pupils: Pupils are equal, round, and reactive to light.   Pulmonary:      Effort: Pulmonary effort is normal.      Breath sounds: Normal breath sounds.   Cardiovascular:      PMI at left midclavicular line. Normal rate. Regular rhythm.      Murmurs: There is no murmur.      No gallop.  No click. No rub.   Pulses:     Intact distal pulses.   Musculoskeletal: Normal range of motion.      Cervical back: Normal range of motion. Skin:     General: Skin is warm and dry.   Neurological:      General: No focal deficit present.      Mental Status: Alert and oriented to person, place and time.

## 2024-06-26 DIAGNOSIS — I10 PRIMARY HYPERTENSION: ICD-10-CM

## 2024-06-26 NOTE — TELEPHONE ENCOUNTER
Patient stated prescription was never received from the pharmacy that was ordered yesterday at his office visit. Prescription for Diovan re-ordered and routed to provider for signing.

## 2024-06-28 RX ORDER — VALSARTAN AND HYDROCHLOROTHIAZIDE 320; 12.5 MG/1; MG/1
1 TABLET, FILM COATED ORAL DAILY
Qty: 90 TABLET | Refills: 3 | Status: SHIPPED | OUTPATIENT
Start: 2024-06-28 | End: 2025-06-28

## 2024-06-28 NOTE — TELEPHONE ENCOUNTER
Patient called and LM that the pharmacy still has not received the Diovan. Verbal order given to pharmacy for medication per Dr. Jacob James MD approval. Patient informed.     Pharmacy    [facility]/pharmacy #[id number] - [place], [place] - [address] [place] [address]  Phone: [phone]  Fax: [phone]  ANNETTE #: [id number]     valsartan-hydrochlorothiazide (Diovan HCT) 320-12.5 mg tablet [[id number]]    Order Details  Dose: 1 tablet Route: oral Frequency: Daily   Dispense Quantity: 90 tablet Refills: 3    Note to Pharmacy: Losartan  is stopped  Hydrochlorothiazide by itself is stopped.  Carvedilol is now once a day  3.125 mg         Sig: Take 1 tablet by mouth once daily.

## 2024-07-02 ENCOUNTER — TELEPHONE (OUTPATIENT)
Dept: PRIMARY CARE | Facility: CLINIC | Age: 69
End: 2024-07-02
Payer: MEDICARE

## 2024-07-18 ENCOUNTER — APPOINTMENT (OUTPATIENT)
Dept: PRIMARY CARE | Facility: CLINIC | Age: 69
End: 2024-07-18
Payer: MEDICARE

## 2024-07-24 ENCOUNTER — APPOINTMENT (OUTPATIENT)
Dept: CARDIOLOGY | Facility: CLINIC | Age: 69
End: 2024-07-24
Payer: MEDICARE

## 2024-07-24 VITALS
DIASTOLIC BLOOD PRESSURE: 68 MMHG | WEIGHT: 207.4 LBS | HEART RATE: 60 BPM | HEIGHT: 70 IN | BODY MASS INDEX: 29.69 KG/M2 | SYSTOLIC BLOOD PRESSURE: 136 MMHG

## 2024-07-24 DIAGNOSIS — R07.9 CHEST PAIN WITH NORMAL CORONARY ANGIOGRAPHY: ICD-10-CM

## 2024-07-24 DIAGNOSIS — I10 PRIMARY HYPERTENSION: ICD-10-CM

## 2024-07-24 DIAGNOSIS — E78.2 MIXED HYPERLIPIDEMIA: ICD-10-CM

## 2024-07-24 DIAGNOSIS — Z87.891 FORMER SMOKER: ICD-10-CM

## 2024-07-24 PROCEDURE — 3078F DIAST BP <80 MM HG: CPT | Performed by: INTERNAL MEDICINE

## 2024-07-24 PROCEDURE — 99214 OFFICE O/P EST MOD 30 MIN: CPT | Performed by: INTERNAL MEDICINE

## 2024-07-24 PROCEDURE — 3008F BODY MASS INDEX DOCD: CPT | Performed by: INTERNAL MEDICINE

## 2024-07-24 PROCEDURE — 1036F TOBACCO NON-USER: CPT | Performed by: INTERNAL MEDICINE

## 2024-07-24 PROCEDURE — 3075F SYST BP GE 130 - 139MM HG: CPT | Performed by: INTERNAL MEDICINE

## 2024-07-24 PROCEDURE — 1159F MED LIST DOCD IN RCRD: CPT | Performed by: INTERNAL MEDICINE

## 2024-07-24 RX ORDER — CARVEDILOL 3.12 MG/1
3.12 TABLET ORAL DAILY
Qty: 90 TABLET | Refills: 3 | Status: SHIPPED | OUTPATIENT
Start: 2024-07-24 | End: 2025-07-24

## 2024-07-24 ASSESSMENT — ENCOUNTER SYMPTOMS
CARDIOVASCULAR NEGATIVE: 1
CONSTITUTIONAL NEGATIVE: 1
NEUROLOGICAL NEGATIVE: 1
RESPIRATORY NEGATIVE: 1

## 2024-07-24 NOTE — PATIENT INSTRUCTIONS
CMP and Lipid six month  6 month visit  Start Carvedilol  3.125 mg one a daily    Patient educated on proper medication use.   Patient educated on risk factor modification.   Please bring any lab results from other providers / physicians to your next appointment.     Please bring all medicines, vitamins, and herbal supplements with you when you come to the office.     Prescriptions will not be filled unless you are compliant with your follow up appointments or have a follow up appointment scheduled as per instruction of your physician. Refills should be requested at the time of your visit.

## 2024-07-24 NOTE — PROGRESS NOTES
CARDIOLOGY OFFICE VISIT      CHIEF COMPLAINT  Chief Complaint   Patient presents with    Follow-up     4 wks        HISTORY OF PRESENT ILLNESS  Gen Govea is a 68 y.o. year old male patient with hypertension and dyslipidemia who presented with chest pain suggestive of unstable angina and had a cardiac catheterization in May 2024 that showed normal coronaries with normal LV function.  He has been doing well with no recurrent chest pain.  He had an episode Of Subconjunctival Hemorrhage while he was doing some heavy lifting which resolved spontaneously.  He denies any recent chest pain or significant shortness of breath.  He apparently has not been taking Coreg as prescribed and his blood pressure has been running high at home.    ASSESSMENT AND PLAN  1.  Hypertension: Blood pressure appears fairly well-controlled during office visit today.  However based on his home readings, he does get systolics above 150s.  Advised him to take the Coreg as prescribed and continue to monitor his blood pressure.  2.  Dyslipidemia: We will get repeat lipids and LFTs prior to his next follow-up.  3.  Subconjunctival hemorrhage: This is resolved with no further recurrences.  Will continue to optimize blood pressure control.    Problem List Items Addressed This Visit       Chest pain with normal coronary angiography    Hyperlipidemia    Hypertension    BMI 29.0-29.9,adult    Former smoker       Recent Cardiovascular Testing:    Echo-  Stress-  Cath-  Carotid Ultrasound-    Past Medical History  Past Medical History:   Diagnosis Date    Coronary artery disease     Disease of thyroid gland     Hyperlipidemia     Hypertension        Social History  Social History     Tobacco Use    Smoking status: Former     Current packs/day: 0.00     Types: Cigarettes     Quit date:      Years since quittin.5    Smokeless tobacco: Never   Substance Use Topics    Alcohol use: Yes     Alcohol/week: 2.0 - 3.0 standard drinks of alcohol      "Types: 2 - 3 Cans of beer per week     Comment: 1x a week    Drug use: Not Currently       Family History     Family History   Problem Relation Name Age of Onset    Alcohol abuse Mother      Cirrhosis Mother      Pancreatitis Father          Allergies:  No Known Allergies     Outpatient Medications:  Current Outpatient Medications   Medication Instructions    amLODIPine (NORVASC) 10 mg, oral, Daily    aspirin 81 mg EC tablet 1 tablet, oral, Daily    carvedilol (COREG) 3.125 mg, oral, Daily    cholecalciferol (Vitamin D-3) 50 mcg (2,000 unit) capsule 1 capsule, oral, Daily    levothyroxine (SYNTHROID, LEVOXYL) 125 mcg, oral, Daily    PARoxetine (PAXIL) 20 mg, oral, Daily    simvastatin (ZOCOR) 40 mg, oral, Daily    tamsulosin (FLOMAX) 0.4 mg, oral, Daily    traZODone (DESYREL) 50 mg, oral, Nightly    valsartan-hydrochlorothiazide (Diovan HCT) 320-12.5 mg tablet 1 tablet, oral, Daily        Recent Lab Results:    CBC:   Lab Results   Component Value Date    WBC 5.0 05/23/2024    RBC 5.13 05/23/2024    HGB 14.8 05/23/2024    HCT 44.5 05/23/2024     05/23/2024        CMP:    Lab Results   Component Value Date     05/23/2024    K 4.0 05/23/2024     05/23/2024    CO2 31 05/23/2024    BUN 16 05/23/2024    CREATININE 0.65 05/23/2024    GLUCOSE 83 05/23/2024    CALCIUM 9.2 05/23/2024       Magnesium:    No results found for: \"MG\"    Lipid Profile:    Lab Results   Component Value Date    TRIG 77 03/16/2024    HDL 47.1 03/16/2024    LDLCALC 109 (H) 03/16/2024       TSH:    Lab Results   Component Value Date    TSH 2.14 03/16/2024       BNP:   No results found for: \"BNP\"     PT/INR:    No results found for: \"PROTIME\", \"INR\"    HgBA1c:    No results found for: \"HGBA1C\"    BMP:  Lab Results   Component Value Date     05/23/2024     03/16/2024     01/28/2023     09/09/2021     12/17/2019    K 4.0 05/23/2024    K 4.2 03/16/2024    K 3.8 01/28/2023    K 3.9 09/09/2021    K 3.9 " "12/17/2019     05/23/2024     (H) 03/16/2024     01/28/2023     09/09/2021     12/17/2019    CO2 31 05/23/2024    CO2 31 03/16/2024    CO2 29 01/28/2023    CO2 27 09/09/2021    CO2 28 12/17/2019    BUN 16 05/23/2024    BUN 21 03/16/2024    BUN 16 01/28/2023    BUN 17 09/09/2021    BUN 15 12/17/2019    CREATININE 0.65 05/23/2024    CREATININE 0.83 03/16/2024    CREATININE 0.75 01/28/2023    CREATININE 0.76 09/09/2021    CREATININE 0.67 12/17/2019       CBC:  Lab Results   Component Value Date    WBC 5.0 05/23/2024    WBC 4.9 03/16/2024    WBC 5.2 01/28/2023    WBC 5.3 09/09/2021    WBC 4.9 12/17/2019    RBC 5.13 05/23/2024    RBC 4.89 03/16/2024    RBC 5.09 01/28/2023    RBC 5.23 09/09/2021    RBC 5.09 12/17/2019    HGB 14.8 05/23/2024    HGB 14.0 03/16/2024    HGB 14.6 01/28/2023    HGB 15.1 09/09/2021    HGB 14.6 12/17/2019    HCT 44.5 05/23/2024    HCT 42.6 03/16/2024    HCT 44.3 01/28/2023    HCT 46.2 09/09/2021    HCT 44.5 12/17/2019    MCV 87 05/23/2024    MCV 87 03/16/2024    MCV 87 01/28/2023    MCV 88 09/09/2021    MCV 87 12/17/2019    MCH 28.8 05/23/2024    MCH 28.6 03/16/2024    MCHC 33.3 05/23/2024    MCHC 32.9 03/16/2024    MCHC 33.0 01/28/2023    MCHC 32.7 09/09/2021    MCHC 32.8 12/17/2019    RDW 12.5 05/23/2024    RDW 13.0 03/16/2024    RDW 12.5 01/28/2023    RDW 12.6 09/09/2021    RDW 12.4 12/17/2019     05/23/2024     03/16/2024     01/28/2023     09/09/2021     12/17/2019       Cardiac Enzymes:    No results found for: \"TROPHS\"    Hepatic Function Panel:    Lab Results   Component Value Date    ALKPHOS 60 03/16/2024    ALT 20 03/16/2024    AST 19 03/16/2024    PROT 6.9 03/16/2024    BILITOT 0.7 03/16/2024         REVIEW OF SYSTEMS  Review of Systems   Constitutional: Negative.   Cardiovascular: Negative.    Respiratory: Negative.     Neurological: Negative.    All other systems reviewed and are negative.      VITALS  Vitals:    " 07/24/24 0832   BP: 136/68   Pulse: 60     Wt Readings from Last 4 Encounters:   07/24/24 94.1 kg (207 lb 6.4 oz)   06/25/24 94.7 kg (208 lb 11.2 oz)   05/20/24 93 kg (205 lb 1.6 oz)   05/15/24 92.1 kg (203 lb)       PHYSICAL EXAM  Constitutional:       Appearance: Healthy appearance.   Eyes:      Pupils: Pupils are equal, round, and reactive to light.   Pulmonary:      Effort: Pulmonary effort is normal.      Breath sounds: Normal breath sounds.   Cardiovascular:      PMI at left midclavicular line. Normal rate. Regular rhythm.      Murmurs: There is no murmur.      No gallop.  No click. No rub.   Pulses:     Intact distal pulses.   Musculoskeletal: Normal range of motion.      Cervical back: Normal range of motion. Skin:     General: Skin is warm and dry.   Neurological:      General: No focal deficit present.      Mental Status: Alert and oriented to person, place and time.

## 2024-07-29 ENCOUNTER — APPOINTMENT (OUTPATIENT)
Dept: PRIMARY CARE | Facility: CLINIC | Age: 69
End: 2024-07-29
Payer: MEDICARE

## 2024-08-14 ENCOUNTER — APPOINTMENT (OUTPATIENT)
Dept: OTOLARYNGOLOGY | Facility: CLINIC | Age: 69
End: 2024-08-14
Payer: MEDICARE

## 2024-08-14 VITALS
HEIGHT: 70 IN | TEMPERATURE: 97.4 F | DIASTOLIC BLOOD PRESSURE: 70 MMHG | BODY MASS INDEX: 29.76 KG/M2 | SYSTOLIC BLOOD PRESSURE: 131 MMHG

## 2024-08-14 DIAGNOSIS — R49.0 HOARSENESS: Primary | ICD-10-CM

## 2024-08-14 DIAGNOSIS — K21.9 LARYNGOPHARYNGEAL REFLUX (LPR): ICD-10-CM

## 2024-08-14 DIAGNOSIS — J38.3 VOCAL CORD LEUKOPLAKIA: ICD-10-CM

## 2024-08-14 PROCEDURE — 1159F MED LIST DOCD IN RCRD: CPT | Performed by: OTOLARYNGOLOGY

## 2024-08-14 PROCEDURE — 3075F SYST BP GE 130 - 139MM HG: CPT | Performed by: OTOLARYNGOLOGY

## 2024-08-14 PROCEDURE — 3078F DIAST BP <80 MM HG: CPT | Performed by: OTOLARYNGOLOGY

## 2024-08-14 PROCEDURE — 31575 DIAGNOSTIC LARYNGOSCOPY: CPT | Performed by: OTOLARYNGOLOGY

## 2024-08-14 PROCEDURE — 99204 OFFICE O/P NEW MOD 45 MIN: CPT | Performed by: OTOLARYNGOLOGY

## 2024-08-14 PROCEDURE — 1160F RVW MEDS BY RX/DR IN RCRD: CPT | Performed by: OTOLARYNGOLOGY

## 2024-08-14 PROCEDURE — 1036F TOBACCO NON-USER: CPT | Performed by: OTOLARYNGOLOGY

## 2024-08-14 RX ORDER — OMEPRAZOLE 20 MG/1
CAPSULE, DELAYED RELEASE ORAL
Qty: 90 CAPSULE | Refills: 3 | Status: SHIPPED | OUTPATIENT
Start: 2024-08-14

## 2024-08-14 NOTE — PROGRESS NOTES
Impression:  1. Hoarseness        2. Laryngopharyngeal reflux (LPR)        3. Vocal cord leukoplakia             RECOMMENDATIONS/PLAN :  I reassured the patient that his vocal cords are moving equally however they appear irritated and he does have a small area of leukoplakia along the anterior third of his true vocal cords as well as significant swelling along the back of his vocal cords from silent reflux/laryngopharyngeal reflux.  We will start him on omeprazole 20 mg daily and he will continue to drink plenty of water and avoid excessive caffeine.  If his voice does not improve over the next few months, we will then set him up with speech therapy.  I will see him back in 4 months to look at his vocal cords once again and if the area of irritation/leukoplakia does not improve, I may refer him to one of my laryngology colleagues for a biopsy.      **This electronic medical record note was created with the use of voice recognition software.  Despite proofreading, typographical or grammatical errors may be present that could affect meaning of content **    Subjective   Patient ID:     Gen Govea is a 68 y.o. male who presents to the office today complaining of persistent hoarseness on and off.  Sometimes his voice will go out completely.  He denies any significant vocal abuse or yelling.  He does have significant heartburn and reflux issues and he complains of persistent throat clearing with a fullness sensation in the throat.  He denies any sinus drainage or postnasal drip.  He quit smoking about 30 years ago.  He denies heavy alcohol abuse.    ROS:  A detailed 12 system review of systems is noted on the intake form has been reviewed with the patient with details noted in the HPI and scanned into the patient's medical record.    Objective     Past Medical History:   Diagnosis Date    Coronary artery disease     Disease of thyroid gland     Hyperlipidemia     Hypertension         Past Surgical History:  "  Procedure Laterality Date    CARDIAC CATHETERIZATION N/A 5/28/2024    Procedure: Left Heart Cath;  Surgeon: Gregorio Velasquez MD;  Location: [facility];  Service: Cardiovascular;  Laterality: N/A;    COLONOSCOPY          No Known Allergies       Current Outpatient Medications:     amLODIPine (Norvasc) 10 mg tablet, Take 1 tablet (10 mg) by mouth once daily., Disp: 90 tablet, Rfl: 3    aspirin 81 mg EC tablet, Take 1 tablet (81 mg) by mouth once daily., Disp: , Rfl:     carvedilol (Coreg) 3.125 mg tablet, Take 1 tablet (3.125 mg) by mouth once daily., Disp: 90 tablet, Rfl: 3    cholecalciferol (Vitamin D-3) 50 mcg (2,000 unit) capsule, Take 1 capsule (50 mcg) by mouth once daily., Disp: , Rfl:     levothyroxine (Synthroid, Levoxyl) 125 mcg tablet, TAKE 1 TABLET BY MOUTH EVERY DAY, Disp: 90 tablet, Rfl: 1    PARoxetine (Paxil) 20 mg tablet, TAKE 1 TABLET BY MOUTH EVERY DAY, Disp: 90 tablet, Rfl: 3    simvastatin (Zocor) 40 mg tablet, Take 1 tablet (40 mg) by mouth once daily., Disp: 90 tablet, Rfl: 3    tamsulosin (Flomax) 0.4 mg 24 hr capsule, Take 1 capsule (0.4 mg) by mouth once daily., Disp: 90 capsule, Rfl: 3    traZODone (Desyrel) 50 mg tablet, Take 1 tablet (50 mg) by mouth once daily at bedtime., Disp: 90 tablet, Rfl: 3    valsartan-hydrochlorothiazide (Diovan HCT) 320-12.5 mg tablet, Take 1 tablet by mouth once daily., Disp: 90 tablet, Rfl: 3     Tobacco Use: Medium Risk (8/14/2024)    Patient History     Smoking Tobacco Use: Former     Smokeless Tobacco Use: Never     Passive Exposure: Not on file        Alcohol Use: Not on file        Social History     Substance and Sexual Activity   Drug Use Not Currently        Physical Exam:  Visit Vitals  /70   Temp 36.3 °C (97.4 °F) (Temporal)   Ht 1.778 m (5' 10\")   BMI 29.76 kg/m²   Smoking Status Former   BSA 2.16 m²      General: Patient is alert, oriented, cooperative in no apparent distress.  Voice is raspy and hoarse  Head: " Normocephalic, atraumatic.  Eyes: PERRL, EOMI, Conjunctiva is clear. No nystagmus.  Ears: Right Ear-- Pinna is normal.  External auditory canal is patent. Tympanic membrane is [intact, translucent and has good mobility with my pneumatic otoscope. No effusion].  Mastoid is nontender.  Left ear-- Pinna is normal.  External auditory canal is patent. Tympanic membrane is [intact, translucent and has good mobility with my pneumatic otoscope.  No effusion].  Mastoid is nontender.  Nose: Septum is mildly deviated.  No septal perforation or lesions. No septal hematoma/ seroma.  No signs of bleeding.  Inferior turbinates are mildly swollen.   No evidence of intranasal polyps.  No infectious drainage.  Throat:  Floor of mouth is clear, no masses.  Tongue appears normal, no lesions or masses. Gums, gingiva, buccal mucosa appear pink and moist, no lesions. Teeth are in good repair.  No obvious dental infections.  Peritonsillar regions appear symmetric without swelling.  Hard and soft palate appear normal, no obvious cleft. Uvula is midline.  Oropharynx: No lesions. Retropharyngeal wall is flat.  No active postnasal drip.  Neck: Supple,  no lymphadenopathy.  No masses.  Salivary Glands: Symmetric bilaterally.  No palpable masses.  No evidence of acute infection or salivary stones  Neurologic: Cranial Nerves 2-12 are grossly intact without focal deficits. Cerebellar function testing is normal.     Results:   []    Procedure:   Pre Op Diagnosis: Persistent hoarseness-rule out vocal cord nodules  Post Op Diagnosis: Same  Procedure: Flexible Nasopharyngolaryngoscopy  Surgeon: Paulie Pedraza DO  Assist: None  Anesthesia: Topical Lidocaine 4%/ 0.05% Afrin 1:1 mixture  EBL: None  Complications: None  Disposition: The patient tolerated the procedure well. There were no complications.    Procedure:  After informed consent was obtained with the risks, benefits, complications and alternatives explained to the patient/ guardian, the patient  was sat up in the ENT chair and the nose was anesthetized and decongested with topical  4% lidocaine and 0.05% Afrin. After allowing this to take effect, the flexible nasopharyngoscope was advanced thru the nostrils and down to the larynx. The following areas were visualized:  The nasal passages, septum, nasopharynx, sinus ostia, base of tongue, epiglottis, true and false vocal folds, arytenoids, post cricoid region and subglottis were all examined and found to be normal except as follows:  Septum has a mild deviation to the left.  Ostiomeatal complexes are clear bilaterally.  No pus polyps or lesions.  Nasopharynx is clear.  No masses.  Base of tongue clear.  Epiglottis is normal.  True vocal cords are approximating equally bilaterally.  His vocal cords appear somewhat irritated and he does have a small portion of leukoplakia along the anterior third of each true vocal cord.  Subglottis is clear.  Arytenoids show severe edema consistent with silent reflux/laryngopharyngeal reflux.      The patient tolerated the procedure well and there were no complications.      Paulie Pedraza, DO

## 2024-09-05 DIAGNOSIS — E03.9 HYPOTHYROIDISM, UNSPECIFIED: ICD-10-CM

## 2024-09-06 RX ORDER — LEVOTHYROXINE SODIUM 125 UG/1
125 TABLET ORAL DAILY
Qty: 90 TABLET | Refills: 1 | Status: SHIPPED | OUTPATIENT
Start: 2024-09-06

## 2024-10-29 ENCOUNTER — APPOINTMENT (OUTPATIENT)
Dept: CARDIOLOGY | Facility: CLINIC | Age: 69
End: 2024-10-29
Payer: MEDICARE

## 2024-10-29 VITALS
BODY MASS INDEX: 29.94 KG/M2 | HEART RATE: 60 BPM | WEIGHT: 209.1 LBS | HEIGHT: 70 IN | SYSTOLIC BLOOD PRESSURE: 138 MMHG | DIASTOLIC BLOOD PRESSURE: 80 MMHG

## 2024-10-29 DIAGNOSIS — E78.2 MIXED HYPERLIPIDEMIA: ICD-10-CM

## 2024-10-29 DIAGNOSIS — I10 PRIMARY HYPERTENSION: ICD-10-CM

## 2024-10-29 DIAGNOSIS — Z87.891 FORMER SMOKER: ICD-10-CM

## 2024-10-29 DIAGNOSIS — R07.9 CHEST PAIN WITH NORMAL CORONARY ANGIOGRAPHY: ICD-10-CM

## 2024-10-29 PROCEDURE — 99214 OFFICE O/P EST MOD 30 MIN: CPT | Performed by: INTERNAL MEDICINE

## 2024-10-29 PROCEDURE — 1159F MED LIST DOCD IN RCRD: CPT | Performed by: INTERNAL MEDICINE

## 2024-10-29 PROCEDURE — 3079F DIAST BP 80-89 MM HG: CPT | Performed by: INTERNAL MEDICINE

## 2024-10-29 PROCEDURE — 3075F SYST BP GE 130 - 139MM HG: CPT | Performed by: INTERNAL MEDICINE

## 2024-10-29 PROCEDURE — 3008F BODY MASS INDEX DOCD: CPT | Performed by: INTERNAL MEDICINE

## 2024-10-29 PROCEDURE — 1036F TOBACCO NON-USER: CPT | Performed by: INTERNAL MEDICINE

## 2024-10-29 ASSESSMENT — ENCOUNTER SYMPTOMS
NEUROLOGICAL NEGATIVE: 1
RESPIRATORY NEGATIVE: 1
CONSTITUTIONAL NEGATIVE: 1
CARDIOVASCULAR NEGATIVE: 1

## 2024-12-09 ENCOUNTER — TELEPHONE (OUTPATIENT)
Dept: PRIMARY CARE | Facility: CLINIC | Age: 69
End: 2024-12-09
Payer: MEDICARE

## 2024-12-09 DIAGNOSIS — Z87.891 FORMER SMOKER: ICD-10-CM

## 2024-12-09 DIAGNOSIS — R49.0 DYSPHONIA: Primary | ICD-10-CM

## 2024-12-09 NOTE — TELEPHONE ENCOUNTER
Pt asking for referral to see Dr. Hickey, ENT.  He has been having issues with a harsh, raspy voice.

## 2024-12-17 ENCOUNTER — APPOINTMENT (OUTPATIENT)
Dept: OTOLARYNGOLOGY | Facility: CLINIC | Age: 69
End: 2024-12-17
Payer: MEDICARE

## 2024-12-17 VITALS
TEMPERATURE: 97.9 F | SYSTOLIC BLOOD PRESSURE: 158 MMHG | DIASTOLIC BLOOD PRESSURE: 87 MMHG | HEIGHT: 70 IN | BODY MASS INDEX: 30 KG/M2

## 2024-12-17 DIAGNOSIS — K21.9 LARYNGOPHARYNGEAL REFLUX (LPR): ICD-10-CM

## 2024-12-17 DIAGNOSIS — J38.3 LEUKOPLAKIA OF VOCAL CORDS: ICD-10-CM

## 2024-12-17 DIAGNOSIS — R49.0 HOARSENESS: Primary | ICD-10-CM

## 2024-12-17 PROCEDURE — 31575 DIAGNOSTIC LARYNGOSCOPY: CPT | Performed by: OTOLARYNGOLOGY

## 2024-12-17 PROCEDURE — 3077F SYST BP >= 140 MM HG: CPT | Performed by: OTOLARYNGOLOGY

## 2024-12-17 PROCEDURE — 1036F TOBACCO NON-USER: CPT | Performed by: OTOLARYNGOLOGY

## 2024-12-17 PROCEDURE — 99214 OFFICE O/P EST MOD 30 MIN: CPT | Performed by: OTOLARYNGOLOGY

## 2024-12-17 PROCEDURE — 3079F DIAST BP 80-89 MM HG: CPT | Performed by: OTOLARYNGOLOGY

## 2024-12-17 PROCEDURE — 1160F RVW MEDS BY RX/DR IN RCRD: CPT | Performed by: OTOLARYNGOLOGY

## 2024-12-17 PROCEDURE — 1159F MED LIST DOCD IN RCRD: CPT | Performed by: OTOLARYNGOLOGY

## 2024-12-17 RX ORDER — OMEPRAZOLE 20 MG/1
CAPSULE, DELAYED RELEASE ORAL
Qty: 180 CAPSULE | Refills: 3 | Status: SHIPPED | OUTPATIENT
Start: 2024-12-17

## 2024-12-17 NOTE — PROGRESS NOTES
Impression:  1. Hoarseness        2. Laryngopharyngeal reflux (LPR)  omeprazole (PriLOSEC) 20 mg DR capsule      3. Leukoplakia of vocal cords             RECOMMENDATIONS/PLAN :  I explained to the patient that he has significant swelling along the back of his larynx from silent reflux/laryngal pharyngeal reflux.  We will increase his omeprazole to 20 mg twice daily for the next several months.  I would like to refer him to one of my laryngology colleagues due to the fact that he still has a small area of leukoplakia along his right true vocal cord and this will most likely need a biopsy.  He will continue to drink plenty of water and avoid excessive caffeine.      **This electronic medical record note was created with the use of voice recognition software.  Despite proofreading, typographical or grammatical errors may be present that could affect meaning of content **    Subjective   Patient ID:     Gen Govea is a 69 y.o. male who presents to the office today as a checkup on his vocal cords.  He has had hoarseness that has worsened over the last month or so.  He denies any fever chills or postnasal drip.  No sinus drainage.  No hemoptysis or hematemesis.  He is not choking on foods or liquids.  He is still clearing his throat despite the fact that he has been taking omeprazole once daily.    ROS:  A detailed 12 system review of systems is noted on the intake form has been reviewed with the patient with details noted in the HPI and scanned into the patient's medical record.    Objective     Past Medical History:   Diagnosis Date    Coronary artery disease     Disease of thyroid gland     Hyperlipidemia     Hypertension         Past Surgical History:   Procedure Laterality Date    CARDIAC CATHETERIZATION N/A 5/28/2024    Procedure: Left Heart Cath;  Surgeon: Gregorio Velasquez MD;  Location: [facility];  Service: Cardiovascular;  Laterality: N/A;    COLONOSCOPY          No Known Allergies  "      Current Outpatient Medications:     amLODIPine (Norvasc) 10 mg tablet, Take 1 tablet (10 mg) by mouth once daily., Disp: 90 tablet, Rfl: 3    aspirin 81 mg EC tablet, Take 1 tablet (81 mg) by mouth once daily., Disp: , Rfl:     carvedilol (Coreg) 3.125 mg tablet, Take 1 tablet (3.125 mg) by mouth once daily., Disp: 90 tablet, Rfl: 3    cholecalciferol (Vitamin D-3) 50 mcg (2,000 unit) capsule, Take 1 capsule (50 mcg) by mouth once daily., Disp: , Rfl:     levothyroxine (Synthroid, Levoxyl) 125 mcg tablet, TAKE 1 TABLET BY MOUTH EVERY DAY, Disp: 90 tablet, Rfl: 1    PARoxetine (Paxil) 20 mg tablet, TAKE 1 TABLET BY MOUTH EVERY DAY, Disp: 90 tablet, Rfl: 3    simvastatin (Zocor) 40 mg tablet, Take 1 tablet (40 mg) by mouth once daily., Disp: 90 tablet, Rfl: 3    tamsulosin (Flomax) 0.4 mg 24 hr capsule, Take 1 capsule (0.4 mg) by mouth once daily., Disp: 90 capsule, Rfl: 3    valsartan-hydrochlorothiazide (Diovan HCT) 320-12.5 mg tablet, Take 1 tablet by mouth once daily., Disp: 90 tablet, Rfl: 3    omeprazole (PriLOSEC) 20 mg DR capsule, 20 mg by mouth twice daily, Disp: 180 capsule, Rfl: 3    traZODone (Desyrel) 50 mg tablet, Take 1 tablet (50 mg) by mouth once daily at bedtime., Disp: 90 tablet, Rfl: 3     Tobacco Use: Medium Risk (12/17/2024)    Patient History     Smoking Tobacco Use: Former     Smokeless Tobacco Use: Never     Passive Exposure: Not on file        Alcohol Use: Not on file        Social History     Substance and Sexual Activity   Drug Use Not Currently        Physical Exam:  Visit Vitals  /87   Temp 36.6 °C (97.9 °F) (Temporal)   Ht 1.778 m (5' 10\")   BMI 30.00 kg/m²   Smoking Status Former   BSA 2.16 m²      General: Patient is alert, oriented, cooperative in no apparent distress.  Head: Normocephalic, atraumatic.  Eyes: PERRL, EOMI, Conjunctiva is clear. No nystagmus.  Ears: Right Ear-- Pinna is normal.  External auditory canal is patent. Tympanic membrane is [intact, translucent and " has good mobility with my pneumatic otoscope. No effusion].  Mastoid is nontender.  Left ear-- Pinna is normal.  External auditory canal is patent. Tympanic membrane is [intact, translucent and has good mobility with my pneumatic otoscope.  No effusion].  Mastoid is nontender.  Nose: Septum is mildly deviated to the left.  No septal perforation or lesions. No septal hematoma/ seroma.  No signs of bleeding.  Inferior turbinates are mildly swollen.   No evidence of intranasal polyps.  No infectious drainage.  Throat:  Floor of mouth is clear, no masses.  Tongue appears normal, no lesions or masses. Gums, gingiva, buccal mucosa appear pink and moist, no lesions. Teeth are in good repair.  No obvious dental infections.  Peritonsillar regions appear symmetric without swelling.  Hard and soft palate appear normal, no obvious cleft. Uvula is midline.  Oropharynx: No lesions. Retropharyngeal wall is flat.  No active postnasal drip.  Neck: Supple,  no lymphadenopathy.  No masses.  Salivary Glands: Symmetric bilaterally.  No palpable masses.  No evidence of acute infection or salivary stones  Neurologic: Cranial Nerves 2-12 are grossly intact without focal deficits. Cerebellar function testing is normal.     Results:   []    Procedure:   Pre Op Diagnosis: Chronic hoarseness with a history of true vocal cord leukoplakia-status of vocal cords  Post Op Diagnosis: Same  Procedure: Flexible Nasopharyngolaryngoscopy  Surgeon: Paulie Pedraza DO  Assist: None  Anesthesia: Topical Lidocaine 4%/ 0.05% Afrin 1:1 mixture  EBL: None  Complications: None  Disposition: The patient tolerated the procedure well. There were no complications.    Procedure:  After informed consent was obtained with the risks, benefits, complications and alternatives explained to the patient/ guardian, the patient was sat up in the ENT chair and the nose was anesthetized and decongested with topical  4% lidocaine and 0.05% Afrin. After allowing this to take  effect, the flexible nasopharyngoscope was advanced thru the nostrils and down to the larynx. The following areas were visualized:  The nasal passages, septum, nasopharynx, sinus ostia, base of tongue, epiglottis, true and false vocal folds, arytenoids, post cricoid region and subglottis were all examined and found to be normal except as follows:  Septum has a mild deviation to the left.  Inferior turbinates are mildly swollen.  Ostiomeatal complexes are clear bilaterally.  No pus polyps or lesions.  Nasopharynx is clear.  No postnasal drip.  Base of tongue clear.  Epiglottis is normal.  Along his right true vocal cord he does have a small area of leukoplakia.  Vocal cords are moving equally.  No other nodules or lesions.  Subglottis is clear.  Arytenoids show severe edema consistent with silent reflux/laryngopharyngeal reflux.      The patient tolerated the procedure well and there were no complications.      Paulie Pedraza, DO

## 2024-12-29 DIAGNOSIS — F34.1 DYSTHYMIC DISORDER: ICD-10-CM

## 2024-12-30 RX ORDER — PAROXETINE HYDROCHLORIDE 20 MG/1
20 TABLET, FILM COATED ORAL DAILY
Qty: 90 TABLET | Refills: 3 | Status: SHIPPED | OUTPATIENT
Start: 2024-12-30

## 2025-01-21 ENCOUNTER — APPOINTMENT (OUTPATIENT)
Dept: OTOLARYNGOLOGY | Facility: CLINIC | Age: 70
End: 2025-01-21
Payer: MEDICARE

## 2025-01-28 ENCOUNTER — OFFICE VISIT (OUTPATIENT)
Dept: OTOLARYNGOLOGY | Facility: CLINIC | Age: 70
End: 2025-01-28
Payer: MEDICARE

## 2025-01-28 ENCOUNTER — APPOINTMENT (OUTPATIENT)
Dept: CARDIOLOGY | Facility: CLINIC | Age: 70
End: 2025-01-28
Payer: MEDICARE

## 2025-01-28 ENCOUNTER — LAB (OUTPATIENT)
Dept: LAB | Facility: CLINIC | Age: 70
End: 2025-01-28
Payer: MEDICARE

## 2025-01-28 VITALS
BODY MASS INDEX: 27.98 KG/M2 | SYSTOLIC BLOOD PRESSURE: 177 MMHG | TEMPERATURE: 97.5 F | WEIGHT: 206.57 LBS | DIASTOLIC BLOOD PRESSURE: 79 MMHG | HEIGHT: 72 IN

## 2025-01-28 DIAGNOSIS — Z01.818 PRE-OPERATIVE CLEARANCE: ICD-10-CM

## 2025-01-28 DIAGNOSIS — J38.7 LARYNGEAL MASS: ICD-10-CM

## 2025-01-28 DIAGNOSIS — R49.0 DYSPHONIA: Primary | ICD-10-CM

## 2025-01-28 DIAGNOSIS — J38.3 LEUKOPLAKIA OF VOCAL CORDS: ICD-10-CM

## 2025-01-28 LAB
ANION GAP SERPL CALC-SCNC: 12 MMOL/L (ref 10–20)
BASOPHILS # BLD AUTO: 0.02 X10*3/UL (ref 0–0.1)
BASOPHILS NFR BLD AUTO: 0.3 %
BUN SERPL-MCNC: 13 MG/DL (ref 6–23)
CALCIUM SERPL-MCNC: 9.5 MG/DL (ref 8.6–10.3)
CHLORIDE SERPL-SCNC: 102 MMOL/L (ref 98–107)
CO2 SERPL-SCNC: 31 MMOL/L (ref 21–32)
CREAT SERPL-MCNC: 0.69 MG/DL (ref 0.5–1.3)
EGFRCR SERPLBLD CKD-EPI 2021: >90 ML/MIN/1.73M*2
EOSINOPHIL # BLD AUTO: 0.05 X10*3/UL (ref 0–0.7)
EOSINOPHIL NFR BLD AUTO: 0.9 %
ERYTHROCYTE [DISTWIDTH] IN BLOOD BY AUTOMATED COUNT: 12.1 % (ref 11.5–14.5)
GLUCOSE SERPL-MCNC: 85 MG/DL (ref 74–99)
HCT VFR BLD AUTO: 42.6 % (ref 41–52)
HGB BLD-MCNC: 14.1 G/DL (ref 13.5–17.5)
IMM GRANULOCYTES # BLD AUTO: 0.01 X10*3/UL (ref 0–0.7)
IMM GRANULOCYTES NFR BLD AUTO: 0.2 % (ref 0–0.9)
LYMPHOCYTES # BLD AUTO: 2.52 X10*3/UL (ref 1.2–4.8)
LYMPHOCYTES NFR BLD AUTO: 43.6 %
MCH RBC QN AUTO: 28.4 PG (ref 26–34)
MCHC RBC AUTO-ENTMCNC: 33.1 G/DL (ref 32–36)
MCV RBC AUTO: 86 FL (ref 80–100)
MONOCYTES # BLD AUTO: 0.58 X10*3/UL (ref 0.1–1)
MONOCYTES NFR BLD AUTO: 10 %
NEUTROPHILS # BLD AUTO: 2.6 X10*3/UL (ref 1.2–7.7)
NEUTROPHILS NFR BLD AUTO: 45 %
NRBC BLD-RTO: NORMAL /100{WBCS}
PLATELET # BLD AUTO: 299 X10*3/UL (ref 150–450)
POTASSIUM SERPL-SCNC: 3.7 MMOL/L (ref 3.5–5.3)
RBC # BLD AUTO: 4.97 X10*6/UL (ref 4.5–5.9)
SODIUM SERPL-SCNC: 141 MMOL/L (ref 136–145)
WBC # BLD AUTO: 5.8 X10*3/UL (ref 4.4–11.3)

## 2025-01-28 PROCEDURE — 1160F RVW MEDS BY RX/DR IN RCRD: CPT | Performed by: OTOLARYNGOLOGY

## 2025-01-28 PROCEDURE — 31579 LARYNGOSCOPY TELESCOPIC: CPT | Performed by: OTOLARYNGOLOGY

## 2025-01-28 PROCEDURE — 3008F BODY MASS INDEX DOCD: CPT | Performed by: OTOLARYNGOLOGY

## 2025-01-28 PROCEDURE — 36415 COLL VENOUS BLD VENIPUNCTURE: CPT

## 2025-01-28 PROCEDURE — 85025 COMPLETE CBC W/AUTO DIFF WBC: CPT

## 2025-01-28 PROCEDURE — 3078F DIAST BP <80 MM HG: CPT | Performed by: OTOLARYNGOLOGY

## 2025-01-28 PROCEDURE — 1159F MED LIST DOCD IN RCRD: CPT | Performed by: OTOLARYNGOLOGY

## 2025-01-28 PROCEDURE — 99215 OFFICE O/P EST HI 40 MIN: CPT | Mod: 25 | Performed by: OTOLARYNGOLOGY

## 2025-01-28 PROCEDURE — 80048 BASIC METABOLIC PNL TOTAL CA: CPT

## 2025-01-28 PROCEDURE — 1036F TOBACCO NON-USER: CPT | Performed by: OTOLARYNGOLOGY

## 2025-01-28 PROCEDURE — 3077F SYST BP >= 140 MM HG: CPT | Performed by: OTOLARYNGOLOGY

## 2025-01-28 PROCEDURE — 99205 OFFICE O/P NEW HI 60 MIN: CPT | Performed by: OTOLARYNGOLOGY

## 2025-01-28 NOTE — H&P (VIEW-ONLY)
"Patient: Gen Govea   MRN: [id number] YOB: 1955   Sex: male Age: 69 y.o.  Date of Service: 2025       ASSESSMENT AND PLAN  I discussed the findings with Gen Govea \"Haroldo\" and have recommended the followin. Dysphonia, exophytic right vocal fold lesion and irregularity on the left medial surface concerning for malignancy. We discussed need for operative biopsy vs excision.  - CT neck with contrast  - Risks, benefits, and alternatives discussed with the patient, including but not limited to bleeding, infection, pain, need for repeat procedure, no improvement in symptoms, dental damage, injury to surrounding structures, and unanticipated mortality. The patient expressed understanding and agrees to proceed.   - Preop labs  - PAT ordered; he will review his medication list and let us know if he is on anticoagulation. OK to continue aspirin      CHIEF COMPLAINT  Dysphonia    HISTORY OF PRESENT ILLNESS  Gen Meza" is a 69 y.o. male referred by Dr. Pedraza, Paulie JORDAN DO for evaluation of dysphonia.     25  The patient reports voice issues since last summer, worse in the morning. Some irritation if he talks a lot. 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 Denies weight loss, no hemoptysis.    PMH: HTN, HLD  Meds: he is on a blood thinner although it is not listed in his med list, he is not sure the name  SH: quit smoking 35-40 years ago, previously 1ppd, used to drink 2-3 drinks a week but has since quit because it bothered his throat    ADDITIONAL HISTORY  Past Medical History  He has a past medical history of Coronary artery disease, Disease of thyroid gland, Hyperlipidemia, and Hypertension. Surgical History  He has a past surgical history that includes Colonoscopy and Cardiac catheterization (N/A, 2024).   Social History  He reports that he quit smoking about 40 years ago. His smoking use included cigarettes. He has never used smokeless tobacco. He reports " current alcohol use of about 2.0 - 3.0 standard drinks of alcohol per week. He reports that he does not currently use drugs. Allergies  Patient has no known allergies.     Family History  Family History   Problem Relation Name Age of Onset    Alcohol abuse Mother      Cirrhosis Mother      Pancreatitis Father          REVIEW OF SYSTEMS  All 10 systems were reviewed and negative except for above.      PHYSICAL EXAM  ENT Physical Exam   GENERAL: Well-nourished and developed, alert and appropriate, no distress, voice [id number]  RESPIRATORY: Breathing quietly, no stridor  HEAD: Normocephalic atraumatic  FACE: Symmetric, no masses or lesions  EYES:  Pupils reactive, sclera clear, external ocular muscles intact, no nystagmus.    EARS:  Pinnae normal.   NOSE:  No anterior lesions, masses or polyps.  ORAL CAVITY/OROPHARYNX:  Buccal mucosa is moist without lesions or masses, tongue midline and palate elevates symmetrically. Tongue mobility intact.  NECK:  Soft. There is no abnormal lymphadenopathy or thyromegaly.    NEUROLOGIC:  Cranial nerves II-XII grossly intact.       Last Recorded Vitals  Blood pressure 177/79, temperature 36.4 °C (97.5 °F), temperature source Temporal, height 1.829 m (6'), weight 93.7 kg (206 lb 9.1 oz).    RESULTS    Patient Reported Outcome Measures  N/A    Laboratory, Radiology, and Pathology  I personally reviewed the following results, with the following interpretation:   Cardiac catheterization May 2024 - normal coronaries with normal LV function.        PROCEDURES  Flexible Fiberoptic Laryngoscopy with Stroboscopy    Patient failed a mirror exam due to limitations of equipment and the need for stroboscopy to assess glottic vibration and closure.     PREOPERATIVE DIAGNOSIS: Dysphonia    POSTOPERATIVE DIAGNOSIS: Same    PROCEDURE:  Strobovideolaryngoscopy    ANESTHESIA:  Topical    COMPLICATIONS:  None    SPECIMENS:  None    PROCEDURE IN DETAIL: The patient was seated in an upright position.  The  nasal cavity was topically decongested and anesthetized.  The stroboscopic microphone was held to the neck at the level of the larynx.  The distal chip video laryngoscope was passed through the nasal cavity.  The nasal cavity and nasopharynx were within normal limits except noted below.  The following findings on stroboscopy were noted:      Tongue Base: no masses or lesions   Vocal Fold Mobility               Right VF: mobile               Left VF: mobile   TVF Appearance               Edema/Erythema: mild edema and erythema               Lesions/vibratory margin irregularities: exophytic lesion on right mid fold with additional irregularity on the left   Glottic Closure Pattern: complete    Vibration:               Phase: asymmetric   Periodicity: irregular               Amplitude: decreased               Waveform: decreased    Muscle Tension Patterns:  lateral   Other Findings: none    The patient tolerated the procedure well.         ----------------------------------------------------------------------  Julia Tello MD, MAEd    [facility]  Department of Otolaryngology - Head and Neck Surgery  [facility]    The total time I spent in care of this patient today (excluding time spent on other billable services) is as follows:    Time Spent  Prep time on day of patient encounter: 15 minutes  Time spent directly with patient, family or caregiver: 30 minutes  Additional Time Spent on Patient Care Activities: 5 minutes  Documentation Time: 10 minutes  Other Time Spent: 0 minutes  Total: 60 minutes

## 2025-01-28 NOTE — PROGRESS NOTES
"Patient: Gen Govea   MRN: [id number] YOB: 1955   Sex: male Age: 69 y.o.  Date of Service: 2025       ASSESSMENT AND PLAN  I discussed the findings with Gen Govea \"Haroldo\" and have recommended the followin. Dysphonia, exophytic right vocal fold lesion and irregularity on the left medial surface concerning for malignancy. We discussed need for operative biopsy vs excision.  - CT neck with contrast  - Risks, benefits, and alternatives discussed with the patient, including but not limited to bleeding, infection, pain, need for repeat procedure, no improvement in symptoms, dental damage, injury to surrounding structures, and unanticipated mortality. The patient expressed understanding and agrees to proceed.   - Preop labs  - PAT ordered; he will review his medication list and let us know if he is on anticoagulation. OK to continue aspirin      CHIEF COMPLAINT  Dysphonia    HISTORY OF PRESENT ILLNESS  Gen Meza" is a 69 y.o. male referred by Dr. Pedraza, Paulie JORDAN DO for evaluation of dysphonia.     25  The patient reports voice issues since last summer, worse in the morning. Some irritation if he talks a lot. 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 Denies weight loss, no hemoptysis.    PMH: HTN, HLD  Meds: he is on a blood thinner although it is not listed in his med list, he is not sure the name  SH: quit smoking 35-40 years ago, previously 1ppd, used to drink 2-3 drinks a week but has since quit because it bothered his throat    ADDITIONAL HISTORY  Past Medical History  He has a past medical history of Coronary artery disease, Disease of thyroid gland, Hyperlipidemia, and Hypertension. Surgical History  He has a past surgical history that includes Colonoscopy and Cardiac catheterization (N/A, 2024).   Social History  He reports that he quit smoking about 40 years ago. His smoking use included cigarettes. He has never used smokeless tobacco. He reports " current alcohol use of about 2.0 - 3.0 standard drinks of alcohol per week. He reports that he does not currently use drugs. Allergies  Patient has no known allergies.     Family History  Family History   Problem Relation Name Age of Onset    Alcohol abuse Mother      Cirrhosis Mother      Pancreatitis Father          REVIEW OF SYSTEMS  All 10 systems were reviewed and negative except for above.      PHYSICAL EXAM  ENT Physical Exam   GENERAL: Well-nourished and developed, alert and appropriate, no distress, voice [id number]  RESPIRATORY: Breathing quietly, no stridor  HEAD: Normocephalic atraumatic  FACE: Symmetric, no masses or lesions  EYES:  Pupils reactive, sclera clear, external ocular muscles intact, no nystagmus.    EARS:  Pinnae normal.   NOSE:  No anterior lesions, masses or polyps.  ORAL CAVITY/OROPHARYNX:  Buccal mucosa is moist without lesions or masses, tongue midline and palate elevates symmetrically. Tongue mobility intact.  NECK:  Soft. There is no abnormal lymphadenopathy or thyromegaly.    NEUROLOGIC:  Cranial nerves II-XII grossly intact.       Last Recorded Vitals  Blood pressure 177/79, temperature 36.4 °C (97.5 °F), temperature source Temporal, height 1.829 m (6'), weight 93.7 kg (206 lb 9.1 oz).    RESULTS    Patient Reported Outcome Measures  N/A    Laboratory, Radiology, and Pathology  I personally reviewed the following results, with the following interpretation:   Cardiac catheterization May 2024 - normal coronaries with normal LV function.        PROCEDURES  Flexible Fiberoptic Laryngoscopy with Stroboscopy    Patient failed a mirror exam due to limitations of equipment and the need for stroboscopy to assess glottic vibration and closure.     PREOPERATIVE DIAGNOSIS: Dysphonia    POSTOPERATIVE DIAGNOSIS: Same    PROCEDURE:  Strobovideolaryngoscopy    ANESTHESIA:  Topical    COMPLICATIONS:  None    SPECIMENS:  None    PROCEDURE IN DETAIL: The patient was seated in an upright position.  The  nasal cavity was topically decongested and anesthetized.  The stroboscopic microphone was held to the neck at the level of the larynx.  The distal chip video laryngoscope was passed through the nasal cavity.  The nasal cavity and nasopharynx were within normal limits except noted below.  The following findings on stroboscopy were noted:      Tongue Base: no masses or lesions   Vocal Fold Mobility               Right VF: mobile               Left VF: mobile   TVF Appearance               Edema/Erythema: mild edema and erythema               Lesions/vibratory margin irregularities: exophytic lesion on right mid fold with additional irregularity on the left   Glottic Closure Pattern: complete    Vibration:               Phase: asymmetric   Periodicity: irregular               Amplitude: decreased               Waveform: decreased    Muscle Tension Patterns:  lateral   Other Findings: none    The patient tolerated the procedure well.         ----------------------------------------------------------------------  Julia Tello MD, MAEd    [facility]  Department of Otolaryngology - Head and Neck Surgery  [facility]    The total time I spent in care of this patient today (excluding time spent on other billable services) is as follows:    Time Spent  Prep time on day of patient encounter: 15 minutes  Time spent directly with patient, family or caregiver: 30 minutes  Additional Time Spent on Patient Care Activities: 5 minutes  Documentation Time: 10 minutes  Other Time Spent: 0 minutes  Total: 60 minutes

## 2025-01-30 ENCOUNTER — TELEPHONE (OUTPATIENT)
Dept: OTOLARYNGOLOGY | Facility: CLINIC | Age: 70
End: 2025-01-30
Payer: MEDICARE

## 2025-01-30 NOTE — TELEPHONE ENCOUNTER
Spoke to this pt and gave him the CPT codes for his upcoming surgery with Dr. Tello on 2/27 @ [facility]. I also gave him our financial counselor's phone number to discuss that this will be covered by his insurance. CPT codes are 72380, 05933, 66896, 77716. Pt verbalized understanding.

## 2025-01-31 ENCOUNTER — HOSPITAL ENCOUNTER (OUTPATIENT)
Dept: RADIOLOGY | Facility: HOSPITAL | Age: 70
Discharge: HOME | End: 2025-01-31
Payer: MEDICARE

## 2025-01-31 ENCOUNTER — HOSPITAL ENCOUNTER (OUTPATIENT)
Dept: CARDIOLOGY | Facility: HOSPITAL | Age: 70
Discharge: HOME | End: 2025-01-31
Payer: MEDICARE

## 2025-01-31 DIAGNOSIS — Z01.818 PRE-OPERATIVE CLEARANCE: ICD-10-CM

## 2025-01-31 LAB
ATRIAL RATE: 56 BPM
P AXIS: 44 DEGREES
P OFFSET: 197 MS
P ONSET: 134 MS
PR INTERVAL: 152 MS
Q ONSET: 210 MS
QRS COUNT: 9 BEATS
QRS DURATION: 102 MS
QT INTERVAL: 440 MS
QTC CALCULATION(BAZETT): 424 MS
QTC FREDERICIA: 430 MS
R AXIS: -1 DEGREES
T AXIS: 38 DEGREES
T OFFSET: 430 MS
VENTRICULAR RATE: 56 BPM

## 2025-01-31 PROCEDURE — 76376 3D RENDER W/INTRP POSTPROCES: CPT | Performed by: INTERNAL MEDICINE

## 2025-01-31 PROCEDURE — 71046 X-RAY EXAM CHEST 2 VIEWS: CPT

## 2025-01-31 PROCEDURE — 93005 ELECTROCARDIOGRAM TRACING: CPT

## 2025-01-31 PROCEDURE — 93010 ELECTROCARDIOGRAM REPORT: CPT | Performed by: INTERNAL MEDICINE

## 2025-02-04 ENCOUNTER — APPOINTMENT (OUTPATIENT)
Dept: OTOLARYNGOLOGY | Facility: CLINIC | Age: 70
End: 2025-02-04
Payer: MEDICARE

## 2025-02-05 ENCOUNTER — HOSPITAL ENCOUNTER (OUTPATIENT)
Dept: RADIOLOGY | Facility: CLINIC | Age: 70
Discharge: HOME | End: 2025-02-05
Payer: MEDICARE

## 2025-02-05 DIAGNOSIS — J38.7 LARYNGEAL MASS: ICD-10-CM

## 2025-02-05 PROCEDURE — 70491 CT SOFT TISSUE NECK W/DYE: CPT

## 2025-02-05 PROCEDURE — 2550000001 HC RX 255 CONTRASTS: Performed by: OTOLARYNGOLOGY

## 2025-02-05 RX ADMIN — IOHEXOL 75 ML: 350 INJECTION, SOLUTION INTRAVENOUS at 14:49

## 2025-02-10 ENCOUNTER — HOSPITAL ENCOUNTER (EMERGENCY)
Facility: HOSPITAL | Age: 70
Discharge: HOME | End: 2025-02-11
Attending: STUDENT IN AN ORGANIZED HEALTH CARE EDUCATION/TRAINING PROGRAM
Payer: MEDICARE

## 2025-02-10 ENCOUNTER — APPOINTMENT (OUTPATIENT)
Dept: CARDIOLOGY | Facility: HOSPITAL | Age: 70
End: 2025-02-10
Payer: MEDICARE

## 2025-02-10 ENCOUNTER — APPOINTMENT (OUTPATIENT)
Dept: RADIOLOGY | Facility: HOSPITAL | Age: 70
End: 2025-02-10
Payer: MEDICARE

## 2025-02-10 DIAGNOSIS — I10 PRIMARY HYPERTENSION: ICD-10-CM

## 2025-02-10 DIAGNOSIS — E03.9 HYPOTHYROIDISM, UNSPECIFIED: ICD-10-CM

## 2025-02-10 DIAGNOSIS — R55 SYNCOPE, UNSPECIFIED SYNCOPE TYPE: Primary | ICD-10-CM

## 2025-02-10 DIAGNOSIS — G47.00 INSOMNIA, UNSPECIFIED TYPE: ICD-10-CM

## 2025-02-10 LAB
ALBUMIN SERPL BCP-MCNC: 4.8 G/DL (ref 3.4–5)
ALP SERPL-CCNC: 51 U/L (ref 33–136)
ALT SERPL W P-5'-P-CCNC: 20 U/L (ref 10–52)
ANION GAP SERPL CALC-SCNC: 15 MMOL/L (ref 10–20)
APPEARANCE UR: ABNORMAL
APTT PPP: 28 SECONDS (ref 27–38)
AST SERPL W P-5'-P-CCNC: 19 U/L (ref 9–39)
BASOPHILS # BLD AUTO: 0.04 X10*3/UL (ref 0–0.1)
BASOPHILS NFR BLD AUTO: 0.5 %
BILIRUB SERPL-MCNC: 1.8 MG/DL (ref 0–1.2)
BILIRUB UR STRIP.AUTO-MCNC: NEGATIVE MG/DL
BUN SERPL-MCNC: 13 MG/DL (ref 6–23)
CALCIUM SERPL-MCNC: 9.3 MG/DL (ref 8.6–10.3)
CARDIAC TROPONIN I PNL SERPL HS: 4 NG/L (ref 0–20)
CARDIAC TROPONIN I PNL SERPL HS: 4 NG/L (ref 0–20)
CHLORIDE SERPL-SCNC: 99 MMOL/L (ref 98–107)
CO2 SERPL-SCNC: 28 MMOL/L (ref 21–32)
COLOR UR: ABNORMAL
CREAT SERPL-MCNC: 0.8 MG/DL (ref 0.5–1.3)
D DIMER PPP FEU-MCNC: 680 NG/ML FEU
EGFRCR SERPLBLD CKD-EPI 2021: >90 ML/MIN/1.73M*2
EOSINOPHIL # BLD AUTO: 0.06 X10*3/UL (ref 0–0.7)
EOSINOPHIL NFR BLD AUTO: 0.7 %
ERYTHROCYTE [DISTWIDTH] IN BLOOD BY AUTOMATED COUNT: 12.1 % (ref 11.5–14.5)
GLUCOSE BLD MANUAL STRIP-MCNC: 122 MG/DL (ref 74–99)
GLUCOSE SERPL-MCNC: 157 MG/DL (ref 74–99)
GLUCOSE UR STRIP.AUTO-MCNC: NORMAL MG/DL
HCT VFR BLD AUTO: 42.4 % (ref 41–52)
HGB BLD-MCNC: 14.1 G/DL (ref 13.5–17.5)
HOLD SPECIMEN: NORMAL
HYALINE CASTS #/AREA URNS AUTO: ABNORMAL /LPF
IMM GRANULOCYTES # BLD AUTO: 0.03 X10*3/UL (ref 0–0.7)
IMM GRANULOCYTES NFR BLD AUTO: 0.4 % (ref 0–0.9)
INR PPP: 1.1 (ref 0.9–1.1)
KETONES UR STRIP.AUTO-MCNC: ABNORMAL MG/DL
LEUKOCYTE ESTERASE UR QL STRIP.AUTO: NEGATIVE
LYMPHOCYTES # BLD AUTO: 3.04 X10*3/UL (ref 1.2–4.8)
LYMPHOCYTES NFR BLD AUTO: 35.6 %
MCH RBC QN AUTO: 27.9 PG (ref 26–34)
MCHC RBC AUTO-ENTMCNC: 33.3 G/DL (ref 32–36)
MCV RBC AUTO: 84 FL (ref 80–100)
MONOCYTES # BLD AUTO: 0.68 X10*3/UL (ref 0.1–1)
MONOCYTES NFR BLD AUTO: 8 %
MUCOUS THREADS #/AREA URNS AUTO: ABNORMAL /LPF
NEUTROPHILS # BLD AUTO: 4.68 X10*3/UL (ref 1.2–7.7)
NEUTROPHILS NFR BLD AUTO: 54.8 %
NITRITE UR QL STRIP.AUTO: NEGATIVE
NRBC BLD-RTO: 0 /100 WBCS (ref 0–0)
PH UR STRIP.AUTO: 8 [PH]
PLATELET # BLD AUTO: 364 X10*3/UL (ref 150–450)
POTASSIUM SERPL-SCNC: 3.3 MMOL/L (ref 3.5–5.3)
PROT SERPL-MCNC: 7.5 G/DL (ref 6.4–8.2)
PROT UR STRIP.AUTO-MCNC: ABNORMAL MG/DL
PROTHROMBIN TIME: 12.3 SECONDS (ref 9.8–12.8)
RBC # BLD AUTO: 5.05 X10*6/UL (ref 4.5–5.9)
RBC # UR STRIP.AUTO: NEGATIVE MG/DL
RBC #/AREA URNS AUTO: ABNORMAL /HPF
SODIUM SERPL-SCNC: 139 MMOL/L (ref 136–145)
SP GR UR STRIP.AUTO: 1.01
TSH SERPL-ACNC: 2.95 MIU/L (ref 0.44–3.98)
UROBILINOGEN UR STRIP.AUTO-MCNC: NORMAL MG/DL
WBC # BLD AUTO: 8.5 X10*3/UL (ref 4.4–11.3)
WBC #/AREA URNS AUTO: ABNORMAL /HPF

## 2025-02-10 PROCEDURE — 82947 ASSAY GLUCOSE BLOOD QUANT: CPT

## 2025-02-10 PROCEDURE — 85025 COMPLETE CBC W/AUTO DIFF WBC: CPT

## 2025-02-10 PROCEDURE — 2500000004 HC RX 250 GENERAL PHARMACY W/ HCPCS (ALT 636 FOR OP/ED)

## 2025-02-10 PROCEDURE — 84484 ASSAY OF TROPONIN QUANT: CPT

## 2025-02-10 PROCEDURE — 70496 CT ANGIOGRAPHY HEAD: CPT

## 2025-02-10 PROCEDURE — 85730 THROMBOPLASTIN TIME PARTIAL: CPT

## 2025-02-10 PROCEDURE — 93005 ELECTROCARDIOGRAM TRACING: CPT

## 2025-02-10 PROCEDURE — 70498 CT ANGIOGRAPHY NECK: CPT | Performed by: RADIOLOGY

## 2025-02-10 PROCEDURE — 80053 COMPREHEN METABOLIC PANEL: CPT

## 2025-02-10 PROCEDURE — 71275 CT ANGIOGRAPHY CHEST: CPT | Performed by: RADIOLOGY

## 2025-02-10 PROCEDURE — 85610 PROTHROMBIN TIME: CPT

## 2025-02-10 PROCEDURE — 70450 CT HEAD/BRAIN W/O DYE: CPT | Performed by: RADIOLOGY

## 2025-02-10 PROCEDURE — 70450 CT HEAD/BRAIN W/O DYE: CPT

## 2025-02-10 PROCEDURE — 96360 HYDRATION IV INFUSION INIT: CPT

## 2025-02-10 PROCEDURE — 81001 URINALYSIS AUTO W/SCOPE: CPT

## 2025-02-10 PROCEDURE — 36415 COLL VENOUS BLD VENIPUNCTURE: CPT

## 2025-02-10 PROCEDURE — 70496 CT ANGIOGRAPHY HEAD: CPT | Performed by: RADIOLOGY

## 2025-02-10 PROCEDURE — 2550000001 HC RX 255 CONTRASTS: Performed by: STUDENT IN AN ORGANIZED HEALTH CARE EDUCATION/TRAINING PROGRAM

## 2025-02-10 PROCEDURE — 85379 FIBRIN DEGRADATION QUANT: CPT | Performed by: STUDENT IN AN ORGANIZED HEALTH CARE EDUCATION/TRAINING PROGRAM

## 2025-02-10 PROCEDURE — 71275 CT ANGIOGRAPHY CHEST: CPT

## 2025-02-10 PROCEDURE — 99285 EMERGENCY DEPT VISIT HI MDM: CPT | Mod: 25 | Performed by: STUDENT IN AN ORGANIZED HEALTH CARE EDUCATION/TRAINING PROGRAM

## 2025-02-10 PROCEDURE — 84443 ASSAY THYROID STIM HORMONE: CPT

## 2025-02-10 RX ORDER — TRAZODONE HYDROCHLORIDE 50 MG/1
50 TABLET ORAL NIGHTLY
Qty: 90 TABLET | Refills: 3 | Status: SHIPPED | OUTPATIENT
Start: 2025-02-10

## 2025-02-10 RX ORDER — LEVOTHYROXINE SODIUM 125 UG/1
125 TABLET ORAL DAILY
Qty: 90 TABLET | Refills: 1 | Status: SHIPPED | OUTPATIENT
Start: 2025-02-10

## 2025-02-10 RX ORDER — AMLODIPINE BESYLATE 10 MG/1
10 TABLET ORAL DAILY
Qty: 90 TABLET | Refills: 3 | Status: SHIPPED | OUTPATIENT
Start: 2025-02-10

## 2025-02-10 RX ADMIN — SODIUM CHLORIDE, POTASSIUM CHLORIDE, SODIUM LACTATE AND CALCIUM CHLORIDE 1000 ML: 600; 310; 30; 20 INJECTION, SOLUTION INTRAVENOUS at 21:13

## 2025-02-10 RX ADMIN — IOHEXOL 60 ML: 350 INJECTION, SOLUTION INTRAVENOUS at 22:19

## 2025-02-10 RX ADMIN — IOHEXOL 70 ML: 350 INJECTION, SOLUTION INTRAVENOUS at 18:29

## 2025-02-10 ASSESSMENT — PAIN - FUNCTIONAL ASSESSMENT
PAIN_FUNCTIONAL_ASSESSMENT: 0-10
PAIN_FUNCTIONAL_ASSESSMENT: 0-10

## 2025-02-10 ASSESSMENT — PAIN SCALES - GENERAL
PAINLEVEL_OUTOF10: 6
PAINLEVEL_OUTOF10: 6

## 2025-02-10 ASSESSMENT — COLUMBIA-SUICIDE SEVERITY RATING SCALE - C-SSRS
2. HAVE YOU ACTUALLY HAD ANY THOUGHTS OF KILLING YOURSELF?: NO
6. HAVE YOU EVER DONE ANYTHING, STARTED TO DO ANYTHING, OR PREPARED TO DO ANYTHING TO END YOUR LIFE?: NO
1. IN THE PAST MONTH, HAVE YOU WISHED YOU WERE DEAD OR WISHED YOU COULD GO TO SLEEP AND NOT WAKE UP?: NO

## 2025-02-10 ASSESSMENT — PAIN DESCRIPTION - PAIN TYPE: TYPE: ACUTE PAIN

## 2025-02-10 ASSESSMENT — PAIN DESCRIPTION - LOCATION: LOCATION: HEAD

## 2025-02-10 NOTE — PROGRESS NOTES
PHARMACY STROKE RESPONSE      Patient Name: Gen Govea  MRN: [id number]  Location: [place] [address]    Patient Weight (kg):   Wt Readings from Last 1 Encounters:   02/10/25 86.2 kg (190 lb)        An acute Brain Attack has been activated, pharmacy participated in multidisciplinary team bedside response for Gen Govea.  Contraindications for fibrinolytic therapy have been reviewed by pharmacy and any issues relating to medication therapy have been discussed directly with the provider(s) caring for this patient.     Pharmacy aided in the bedside response for fibrinolytic therapy. Patient did not receive fibrinolysis.    Orders Placed This Encounter      iohexol (OMNIPaque) 350 mg iodine/mL solution 70 mL      Thank you for allowing me to take part in the care of this patient.     Donaldo Quinteros, PharmD  2/10/2025  6:40 PM         References:    [facility] [place] Stroke Tools   [facility] [place] IV Thrombolysis Checklist

## 2025-02-10 NOTE — ED TRIAGE NOTES
Emergency Department Encounter  [facility] [place] EMERGENCY MEDICINE    Patient: Gen Govea  MRN: [id number]  : 1955  Date of Evaluation: 2/10/2025  Triage Provider: Campbell Matthews PA-C      Chief Complaint     No chief complaint on file.    [place] [place] [employer] [place]    (Location/Symptom, Timing/Onset, Context/Setting, Quality, Duration, Modifying Factors, Severity) Note limiting factors.       Gen Govea is a 69 y.o. male who presents to the emergency department complaining of dizziness for 1 hour.  Patient states he feels like the room is spinning.  Patient states his has tingling to bilateral hands.    Patient states he has a headache that began when the dizziness began.  Patient states he has had similar headachess previously.  Patient states he has been able to walk still.  Patient denies falls and injury to the head. Patient denies current blurry vision, chest pain, and shortness of breath    Poc 122        Past History     Past Medical History:   Diagnosis Date    Coronary artery disease     Disease of thyroid gland     Hyperlipidemia     Hypertension      Past Surgical History:   Procedure Laterality Date    CARDIAC CATHETERIZATION N/A 2024    Procedure: Left Heart Cath;  Surgeon: Gregorio Velasquez MD;  Location: [facility];  Service: Cardiovascular;  Laterality: N/A;    COLONOSCOPY       Social History     Socioeconomic History    Marital status:    Tobacco Use    Smoking status: Former     Current packs/day: 0.00     Types: Cigarettes     Quit date:      Years since quittin.1    Smokeless tobacco: Never   Substance and Sexual Activity    Alcohol use: Yes     Alcohol/week: 2.0 - 3.0 standard drinks of alcohol     Types: 2 - 3 Cans of beer per week     Comment: 1x a week    Drug use: Not Currently    Sexual activity: Defer       Medications/Allergies     Previous Medications    AMLODIPINE (NORVASC) 10 MG TABLET    TAKE 1 TABLET BY MOUTH EVERY DAY    ASPIRIN 81 MG  EC TABLET    Take 1 tablet (81 mg) by mouth once daily.    CARVEDILOL (COREG) 3.125 MG TABLET    Take 1 tablet (3.125 mg) by mouth once daily.    CHOLECALCIFEROL (VITAMIN D-3) 50 MCG (2,000 UNIT) CAPSULE    Take 1 capsule (50 mcg) by mouth once daily.    LEVOTHYROXINE (SYNTHROID, LEVOXYL) 125 MCG TABLET    TAKE 1 TABLET BY MOUTH EVERY DAY    OMEPRAZOLE (PRILOSEC) 20 MG DR CAPSULE    20 mg by mouth twice daily    PAROXETINE (PAXIL) 20 MG TABLET    TAKE 1 TABLET BY MOUTH EVERY DAY    SIMVASTATIN (ZOCOR) 40 MG TABLET    Take 1 tablet (40 mg) by mouth once daily.    TAMSULOSIN (FLOMAX) 0.4 MG 24 HR CAPSULE    Take 1 capsule (0.4 mg) by mouth once daily.    TRAZODONE (DESYREL) 50 MG TABLET    TAKE 1 TABLET BY MOUTH EVERYDAY AT BEDTIME    VALSARTAN-HYDROCHLOROTHIAZIDE (DIOVAN HCT) 320-12.5 MG TABLET    Take 1 tablet by mouth once daily.     No Known Allergies     Physical Exam       ED Triage Vitals   Temp Pulse Resp BP   -- -- -- --      SpO2 Temp src Heart Rate Source Patient Position   -- -- -- --      BP Location FiO2 (%)     -- --         Physical Exam  Neurological:      Mental Status: He is alert and oriented to person, place, and time.      GCS: GCS eye subscore is 4. GCS verbal subscore is 5. GCS motor subscore is 6.      Cranial Nerves: Cranial nerves 2-12 are intact. No dysarthria or facial asymmetry.      Sensory: Sensation is intact.      Motor: Motor function is intact. No tremor or pronator drift.      Coordination: Coordination is intact. Coordination normal. Finger-Nose-Finger Test and Heel to Shin Test normal. Rapid alternating movements normal.      Gait: Gait is intact.         Focused PE    GENERAL:  The patient appears nourished and normally developed. Vital signs as documented.     PULMONARY:  Lungs are clear to auscultation, without any respiratory distress. Able to speak full sentences, no accessory muscle use    CARDIAC:   Normal rate. No murmurs, rubs or gallops    ABDOMEN:  Soft, non distended,  non tender, BS positive x 4 quadrants, No rebound or guarding, no peritoneal signs, may be limited by patient positioning in triage    MUSCULOSKELETAL:   Able to ambulate, Non edematous, with no obvious deformities. Pulses intact distal    SKIN:   Good color, with no significant rashes.  No pallor.    NEURO:  Alert and oriented, speech clear and coherent    Plan     Given patient had dizziness and feel like the room was spinning that began 1 hour ago as well as a headache that began 1 hour ago there is concern for posterior stroke therefore brain attack was activated. Patient was discussed with Dr. Bergman  NIH 0 at 6:04pm  Imaging: CT head, CTA head and neck  Labs: POC glucose 122, CBC, CMP, troponins, TSH, coags      For the remainder of the patient's workup and ED course, please see the main ED provider note.  We discussed need for diagnostic testing including lab studies and imaging.  We also discussed that they may be asked to wait in the waiting room while these test are pending.  They understand that if they choose to leave without having the testing completed or resulted that we cannot rule out acute life-threatening illnesses and the risks involved to lead to worsening condition, permanent disability or even death.        Comment: Please note this report has been produced using speech recognition software and may contain errors related to that system including errors in grammar, punctuation, and spelling, as well as words and phrases that may be inappropriate.  If there are any questions or concerns please feel free to contact the dictating provider for clarification.    Campbell Matthews PA-C

## 2025-02-10 NOTE — ED NOTES
1813 Stroke alert one push activation by [place]   Emergency overheagracia Vaughn, EMT  02/10/25 1812

## 2025-02-10 NOTE — Clinical Note
Allyssa Govea accompanied Gen Govea to the emergency department on 2/10/2025. They may return to work on 02/12/2025.      If you have any questions or concerns, please don't hesitate to call.      Arabella Marino RN

## 2025-02-11 VITALS
RESPIRATION RATE: 16 BRPM | OXYGEN SATURATION: 96 % | DIASTOLIC BLOOD PRESSURE: 85 MMHG | WEIGHT: 190 LBS | TEMPERATURE: 97.2 F | HEART RATE: 76 BPM | HEIGHT: 71 IN | SYSTOLIC BLOOD PRESSURE: 168 MMHG | BODY MASS INDEX: 26.6 KG/M2

## 2025-02-11 LAB — HOLD SPECIMEN: NORMAL

## 2025-02-11 ASSESSMENT — PAIN SCALES - GENERAL
PAINLEVEL_OUTOF10: 0 - NO PAIN
PAINLEVEL_OUTOF10: 0 - NO PAIN

## 2025-02-11 NOTE — DISCHARGE INSTRUCTIONS
I recommend following up with cardiology and your primary care provider at your earliest convenience to discuss a Holter monitor.  This will look at the electrical activity of your heart over a prolonged period of time.  I would also recommend discussing your blood pressure medications with them.  If you develop chest pain, passout again, or develop other worrisome symptoms, return to the ER.

## 2025-02-11 NOTE — ED PROVIDER NOTES
Emergency Department Provider Note        History of Present Illness     History provided by: Patient and Significant Other  Limitations to History: None  External Records Reviewed with Brief Summary: None    HPI:  Gen Govea is a 69 y.o. male with hypertension, hyperlipidemia, hypothyroidism presenting for dizziness and syncope.  Patient was touring homes when he felt warm and lightheaded.  He then passed out and fell backwards.  His wife was there when this happened.  He was unconscious for only several seconds and when he awoke, was not confused.  He did not have any incontinence during this episode.  He does not think he hit his head.  He does have history of vertigo occasionally and his last episode was several weeks ago.  He has not had any vertigo since then but does report that he has been feeling lightheaded occasionally.  Denies any chest pain, shortness of breath, or other symptoms.  He did recently see an ENT doctor for a nodule in his throat.  This nodule is being evaluated for possible cancer and he is supposed to have a biopsy of this nodule in the future.  The syncopal episode occurred approxi-1 hour prior to ED arrival.    Physical Exam   Triage vitals:  T 36 °C (96.8 °F)  HR 79  BP (!) 188/97  RR 18  O2 100 % None (Room air)    Physical Exam  Vitals and nursing note reviewed.   Constitutional:       General: He is not in acute distress.     Appearance: He is well-developed.   HENT:      Head: Normocephalic and atraumatic.      Right Ear: External ear normal.      Left Ear: External ear normal.      Nose: Nose normal.   Eyes:      General: No scleral icterus.     Conjunctiva/sclera: Conjunctivae normal.      Pupils: Pupils are equal, round, and reactive to light.   Cardiovascular:      Rate and Rhythm: Normal rate and regular rhythm.      Heart sounds: No murmur heard.  Pulmonary:      Effort: Pulmonary effort is normal. No respiratory distress.      Breath sounds: Normal breath sounds.    Abdominal:      Palpations: Abdomen is soft.      Tenderness: There is no abdominal tenderness.   Musculoskeletal:         General: No swelling.      Cervical back: Neck supple. No rigidity.   Skin:     General: Skin is warm and dry.   Neurological:      General: No focal deficit present.      Mental Status: He is alert.      Cranial Nerves: No cranial nerve deficit.      Sensory: No sensory deficit.      Motor: No weakness.      Coordination: Coordination normal.      Comments: NIHSS 0.   Psychiatric:         Mood and Affect: Mood normal.          Medical Decision Making & ED Course   Medical Decision Makin y.o. male presenting for syncope.  However, he also reported dizziness in triage.  It was unclear if this was vertigo initially and given the acute onset, brain attack was called.  Patient was taken for CT head and CTA of the head and neck probably.  These are negative.   NIHSS 0.  Further interview was had with the patient and this is more consistent with a syncopal episode rather than posterior stroke.  Will evaluate as syncope.    CBC negative for leukocytosis or anemia.  Chemistry panel unremarkable.  Troponin negative x 2.  TSH normal.  D-dimer elevated at 680.  Urinalysis negative.  Shows some ketones but negative for glucose or other significant findings.    Patient has an intermittent elevation in his D-dimer.  Will obtain CT imaging of his chest to rule out pulmonary embolism.  Patient is given IV fluids for the multiple IV contrast boluses.     Patient signed out to incoming provider pending a CT angio of the chest and reevaluation.  ----     Social Determinants of Health which Significantly Impact Care: None identified     EKG Independent Interpretation: EKG interpreted by myself. Please see ED Course for full interpretation.    Independent Result Review and Interpretation: Relevant laboratory and radiographic results were reviewed and independently interpreted by myself.  As necessary, they  are commented on in the ED Course.    Chronic conditions affecting the patient's care: As documented above in MDM    The patient was discussed with the following consultants/services: None    Care Considerations: As documented above in [facility]    ED Course:  ED Course as of 02/11/25 0336   Mon Feb 10, 2025   1918 EKG taken at 1807 on 10 February 2025 showing normal sinus rate and rhythm, normal axis, normal intervals, no acute ST elevation or depression [DS]      ED Course User Index  [DS] Stoney Bergman MD         Diagnoses as of 02/11/25 0336   Syncope, unspecified syncope type     Disposition   Patient was signed out to Dr. Wren at 2100 pending completion of their work-up.  Please see the next provider's transition of care note for the remainder of the patient's care.     Procedures   Procedures    Patient seen and discussed with ED attending physician.    Lauri Oliveira DO  Emergency Medicine     Lauri Oliveira DO  Resident  02/11/25 0336

## 2025-02-11 NOTE — ED PROVIDER NOTES
Patient received on handoff with CT angio of the chest pending.  This was negative for any acute cardiopulmonary findings.  Shared decision-making was engaged with the patient to determine whether or not patient should be admitted for syncopal workup versus discharge with cardiology follow-up and recommendations for Holter monitor.  The patient ultimately opted for the latter.  Patient discharged home in satisfactory condition.  All questions answered return precautions discussed.     Kal Wren MD  Resident  02/11/25 0038

## 2025-02-12 LAB
ATRIAL RATE: 77 BPM
P AXIS: 62 DEGREES
P OFFSET: 204 MS
P ONSET: 135 MS
PR INTERVAL: 164 MS
Q ONSET: 217 MS
QRS COUNT: 12 BEATS
QRS DURATION: 100 MS
QT INTERVAL: 396 MS
QTC CALCULATION(BAZETT): 448 MS
QTC FREDERICIA: 430 MS
R AXIS: 55 DEGREES
T AXIS: 45 DEGREES
T OFFSET: 415 MS
VENTRICULAR RATE: 77 BPM

## 2025-02-13 DIAGNOSIS — N40.1 BENIGN PROSTATIC HYPERPLASIA WITH NOCTURIA: ICD-10-CM

## 2025-02-13 DIAGNOSIS — R35.1 BENIGN PROSTATIC HYPERPLASIA WITH NOCTURIA: ICD-10-CM

## 2025-02-13 RX ORDER — TAMSULOSIN HYDROCHLORIDE 0.4 MG/1
0.4 CAPSULE ORAL DAILY
Qty: 90 CAPSULE | Refills: 3 | Status: SHIPPED | OUTPATIENT
Start: 2025-02-13

## 2025-02-19 ENCOUNTER — APPOINTMENT (OUTPATIENT)
Dept: PRIMARY CARE | Facility: CLINIC | Age: 70
End: 2025-02-19
Payer: MEDICARE

## 2025-02-19 ENCOUNTER — APPOINTMENT (OUTPATIENT)
Dept: PREADMISSION TESTING | Facility: HOSPITAL | Age: 70
End: 2025-02-19
Payer: MEDICARE

## 2025-02-20 ENCOUNTER — PRE-ADMISSION TESTING (OUTPATIENT)
Dept: PREADMISSION TESTING | Facility: HOSPITAL | Age: 70
End: 2025-02-20
Payer: MEDICARE

## 2025-02-20 VITALS
WEIGHT: 201.5 LBS | SYSTOLIC BLOOD PRESSURE: 173 MMHG | HEIGHT: 72 IN | TEMPERATURE: 97.9 F | HEART RATE: 81 BPM | BODY MASS INDEX: 27.29 KG/M2 | OXYGEN SATURATION: 96 % | DIASTOLIC BLOOD PRESSURE: 91 MMHG

## 2025-02-20 DIAGNOSIS — Z01.818 PREOP EXAMINATION: ICD-10-CM

## 2025-02-20 DIAGNOSIS — Z41.9 SURGERY, ELECTIVE: Primary | ICD-10-CM

## 2025-02-20 DIAGNOSIS — T67.1XXA HEAT SYNCOPE, INITIAL ENCOUNTER: ICD-10-CM

## 2025-02-20 DIAGNOSIS — Z01.818 PRE-OPERATIVE CLEARANCE: ICD-10-CM

## 2025-02-20 DIAGNOSIS — J38.7 LARYNGEAL MASS: ICD-10-CM

## 2025-02-20 LAB
ABO GROUP (TYPE) IN BLOOD: NORMAL
ANTIBODY SCREEN: NORMAL
RH FACTOR (ANTIGEN D): NORMAL

## 2025-02-20 PROCEDURE — 36415 COLL VENOUS BLD VENIPUNCTURE: CPT

## 2025-02-20 PROCEDURE — 86850 RBC ANTIBODY SCREEN: CPT

## 2025-02-20 PROCEDURE — 99204 OFFICE O/P NEW MOD 45 MIN: CPT | Performed by: STUDENT IN AN ORGANIZED HEALTH CARE EDUCATION/TRAINING PROGRAM

## 2025-02-20 PROCEDURE — 87081 CULTURE SCREEN ONLY: CPT

## 2025-02-20 RX ORDER — CHLORHEXIDINE GLUCONATE 40 MG/ML
1 SOLUTION TOPICAL DAILY
Qty: 25 ML | Refills: 0 | Status: SHIPPED | OUTPATIENT
Start: 2025-02-20 | End: 2025-02-25

## 2025-02-20 RX ORDER — CHLORHEXIDINE GLUCONATE ORAL RINSE 1.2 MG/ML
15 SOLUTION DENTAL DAILY
Qty: 30 ML | Refills: 0 | Status: SHIPPED | OUTPATIENT
Start: 2025-02-20 | End: 2025-02-27

## 2025-02-20 ASSESSMENT — DUKE ACTIVITY SCORE INDEX (DASI)
CAN YOU DO LIGHT WORK AROUND THE HOUSE LIKE DUSTING OR WASHING DISHES: YES
CAN YOU DO MODERATE WORK AROUND THE HOUSE LIKE VACUUMING, SWEEPING FLOORS OR CARRYING GROCERIES: YES
TOTAL_SCORE: 58.2
CAN YOU DO HEAVY WORK AROUND THE HOUSE LIKE SCRUBBING FLOORS OR LIFTING AND MOVING HEAVY FURNITURE: YES
CAN YOU DO YARD WORK LIKE RAKING LEAVES, WEEDING OR PUSHING A MOWER: YES
CAN YOU TAKE CARE OF YOURSELF (EAT, DRESS, BATHE, OR USE TOILET): YES
CAN YOU WALK INDOORS, SUCH AS AROUND YOUR HOUSE: YES
CAN YOU HAVE SEXUAL RELATIONS: YES
CAN YOU RUN A SHORT DISTANCE: YES
DASI METS SCORE: 9.9
CAN YOU WALK A BLOCK OR TWO ON LEVEL GROUND: YES
CAN YOU PARTICIPATE IN MODERATE RECREATIONAL ACTIVITIES LIKE GOLF, BOWLING, DANCING, DOUBLES TENNIS OR THROWING A BASEBALL OR FOOTBALL: YES
CAN YOU CLIMB A FLIGHT OF STAIRS OR WALK UP A HILL: YES
CAN YOU PARTICIPATE IN STRENOUS SPORTS LIKE SWIMMING, SINGLES TENNIS, FOOTBALL, BASKETBALL, OR SKIING: YES

## 2025-02-20 ASSESSMENT — ENCOUNTER SYMPTOMS
NECK NEGATIVE: 1
NEUROLOGICAL NEGATIVE: 1
RESPIRATORY NEGATIVE: 1
CARDIOVASCULAR NEGATIVE: 1
CONSTITUTIONAL NEGATIVE: 1
EYES NEGATIVE: 1
VOICE CHANGE: 1
ENDOCRINE NEGATIVE: 1

## 2025-02-20 ASSESSMENT — LIFESTYLE VARIABLES: SMOKING_STATUS: NONSMOKER

## 2025-02-20 NOTE — PREPROCEDURE INSTRUCTIONS
Thank you for visiting [facility] today for your pre-procedure evaluation, you were seen by Rohit Almaguer MD [phone].    This summary includes instructions and information to aid you during your perioperative period.  Please read carefully. If you have any questions about your visit today, please call the number listed above.  If you become ill or have any changes to your health before your surgery, please contact your primary care provider and alert your surgeon.    Preparing for your Surgery       Exercises  Preoperative Deep Breathing Exercises  Why it is important to do deep breathing exercises before my surgery?  Deep breathing exercises strengthen your breathing muscles.  This helps you to recover after your surgery and decreases the chance of breathing complications.  How are the deep breathing exercises done?  Sit straight with your back supported.  Breathe in deeply and slowly through your nose. Your lower rib cage should expand and your abdomen may move forward.  Hold that breath for 3 to 5 seconds.  Breathe out through pursed lips, slowly and completely.  Rest and repeat 10 times every hour while awake.  Rest longer if you become dizzy or lightheaded.      Preoperative Brain Exercises    What are brain exercises?  A brain exercise is any activity that engages your thinking (cognitive) skills.    What types of activities are considered brain exercises?  Jigsaw puzzles, crossword puzzles, word jumble, memory games, word search, and many more.  Many can be found free online or on your phone via a mobile naina.    Why should I do brain exercises before my surgery?  More recent research has shown brain exercise before surgery can lower the risk of postoperative delirium (confusion) which can be especially important for older adults.  Patients who did brain exercises for 5 to 10 hours the days before surgery, cut their risk of postoperative delirium in half up to 1  week after surgery.    Sit-to-Stand Exercise    What is the sit-to-stand exercise?  The sit-to-stand exercise strengthens the muscles of your lower body and muscles in the center of your body (core muscles for stability) helping to maintain and improve your strength and mobility.  How do I do the sit-to-stand exercise?  The goal is to do this exercise without using your arms or hands.  If this is too difficult, use your arms and hands or a chair with armrests to help slowly push yourself to the standing position and lower yourself back to the sitting position. As the movement becomes easier use your arms and hands less.    Steps to the sit-to-stand exercise  Sit up tall in a sturdy chair, knees bent, feet flat on the floor shoulder-width apart.  Shift your hips/pelvis forward in the chair to correctly position yourself for the next movement.  Lean forward at your hips.  Stand up straight putting equal weight on both feet.  Check to be sure you are properly aligned with the chair, in a slow controlled movement sit back down.  Repeat this exercise 10-15 times.  If needed you can do it fewer times until your strength improves.  Rest for 1 minute.  Do another 10-15 sit-to-stand exercises.  Try to do this in the morning and evening.        Instructions    Preoperative Fasting Guidelines    Why must I stop eating and drinking near surgery time?  With sedation, food or liquid in your stomach can enter your lungs causing serious complications  Food can increase nausea and vomiting  When do I need to stop eating and drinking before my surgery?      Do not eat any food after midnight the night before your surgery/procedure. You may have up to 13.5 ounces of clear liquid until TWO hours before your instructed arrival time to the hospital.  This includes water, black tea/coffee, (no milk or cream) apple juice, and electrolyte drinks (Gatorade). You may chew gum until TWO hours before your surgery/procedure            Simple  things you can do to help prevent blood clots     Blood clots are blockages that can form in the body's veins. When a blood clot forms in your deep veins, it may be called a deep vein thrombosis, or DVT for short. Blood clots can happen in any part of the body where blood flows, but they are most common in the arms and legs. If a piece of a blood clot breaks free and travels to the lungs, it is called a pulmonary embolus (PE). A PE can be a very serious problem.         Being in the hospital or having surgery can raise your chances of getting a blood clot because you may not be well enough to move around as much as you normally do.         Ways you can help prevent blood clots in the hospital       Wearing SCDs  SCDs stands for Sequential Compression Devices.   SCDs are special sleeves that wrap around your legs. They attach to a pump that fills them with air to gently squeeze your legs every few minutes.  This helps return the blood in your legs to your heart.   SCDs should only be taken off when walking or bathing. SCDs may not be comfortable, but they can help save your life.              Pump SCD leg sleeves  Wearing compression stockings - if your doctor orders them. These special snug-fitting stockings gently squeeze your legs to help blood flow.       Walking. Walking helps move the blood in your legs.   If your doctor says it is ok, try walking the halls at least   5 times a day. Ask us to help you get up, so you don't fall.      Taking any blood-thinning medicines your doctor orders.              Ways you can help prevent blood clots at home         Wearing compression stockings - if your doctor orders them.   Walking - to help move the blood in your legs.    Taking any blood-thinning medicines your doctor orders.      Signs of a blood clot or PE    Tell your doctor or nurse right away if you have any of the problems listed below.         If you are at home, seek medical care right away. Call 911 for chest  pain or problems breathing.            Signs of a blood clot (DVT) - such as pain, swelling, redness, or warmth in your arm or legs.  Signs of a pulmonary embolism (PE) - such as chest pain or feeling short of breath      Tobacco and Alcohol;  Do not drink alcohol or smoke within 24 hours of surgery.  It is best to quit smoking for as long as possible before any surgery or procedure.    Quitting Smoking; Recognizing Dangerous Situations:  1-Alcohol use during the first month after quitting, 2-Being around smoke or someone who smokes 3-Times situation routinely smoked  4-Triggers-car, breaks, coffee, when awakening, social events  Coping Skills: 1-Learning new ways to manage stress 2-Exercising 3-Relaxation breathing   4-Change routines 5-Distraction techniques    Websites:  Smoke-Free - offers free text messages and an naina to help you quit. Info includes eating and mood issues that may come with quitting. There is a Live Helpline to talk to an expert. Go to smokefree.gov; Become an Ex-Smoker - the free EX Plan is based on scientific research and useful advice from ex-smokers. It isn't just about quitting smoking.  It's about re-learning life without cigarettes using a 3-step program.  Go to becomeanex.org   Centers for Disease Control offer many suggestions for helping you quit. Includes a Quit Guide and real-life stories. There are sections for specific groups such as LGBT, , different ethnic groups, and pregnant women.  Go to cdc.gov/tobacco/campaign/tips    Other Resources:  [place] Tobacco Quit Line - call 1-800-QUIT-NOW or 1-522.767.5112.  [facility] 2-1-1 - to find local programs and resources. Call 211 or go to 211.org.  [facility] Tobacco Cessation Program - call 196-283-3993.  American Lung Association offers classes for quitting smoking. Some places may charge a fee. For a list of classes, go to lung.org or call 1-800-LUNG-[place].     Some things to think about:; The health benefits of quitting  "smoking can help most of the major parts of your body. There is no safe amount of cigarette smoke. Quitting smoking can add years to your life. When you quit, you'll also protect your loved ones from dangerous secondhand smoke. Make a plan, join a support group, and talk to your physician to assist in quitting smoking.                                                                                                                   Other Instructions  Why did I have my nose, under my arms, and groin swabbed? The purpose of the swab is to identify Staphylococcus aureus inside your nose or on your skin.  The swab was sent to the laboratory for culture.  A positive swab/culture for Staphylococcus aureus is called colonization or carriage.   What is Staphylococcus aureus? Staphylococcus aureus, also known as \"staph\", is a germ found on the skin or in the nose of healthy people.  Sometimes Staphylococcus aureus can get into the body and cause an infection.  This can be minor (such as pimples, boils, or other skin problems).  It might also be serious (such as a blood infection, pneumonia, or a surgical site infection). What is Staphylococcus aureus colonization or carriage? Colonization or carriage means that a person has the germ but is not sick from it.  These bacteria can be spread on the hands or when breathing or sneezing. How is Staphylococcus aureus spread? It is most often spread by close contact with a person or item that carries it. What happens if my culture is positive for Staphylococcus aureus? Your doctor/medical team will use this information to guide any antibiotic treatment which may be necessary.  Regardless of the culture results, we will clean the inside of your nose with a betadine swab just before you have your surgery. Will I get an infection if I have Staphylococcus aureus in my nose or on my skin? Anyone can get an infection with Staphylococcus aureus.  However, the best way to reduce your risk of " infection is to follow the instructions provided to you for the use of your CHG soap and dental rinse.  , Body Wash; What is a home preoperative antibacterial shower? This shower is a way of cleaning the skin with a germ-killing solution before surgery.  The solution contains chlorhexidine, commonly known as CHG.  CHG is a skin cleanser with germ-killing ability.  Let your doctor know if you are allergic to chlorhexidine. Why do I need to take a preoperative antibacterial shower? Skin is not sterile.  It is best to try to make your skin as free of germs as possible before surgery.  Proper cleansing with a germ-killing soap before surgery can lower the number of germs on your skin.  This helps to reduce the risk of infection at the surgical site.  Following the instructions listed below will help you prepare your skin for surgery.   How do I use the solution? Steps:  Begin using your CHG soap 5 days before your scheduled surgery on ___________.   First, wash and rinse your hair using the CHG soap. Keep CHG soap away from ear canals and eyes.  Rinse completely, do not condition.  Hair extensions should be removed. , Oral/Dental Rinse: What is oral/dental rinse?  It is a mouthwash. It is a way of cleaning the mouth with a germ-killing solution before your surgery.  The solution contains chlorhexidine, commonly known as CHG. It is used inside the mouth to kill a bacteria known as Staphylococcus aureus.  Let your doctor know if you are allergic to Chlorhexidine. Why do I need to use CHG oral/dental rinse? The CHG oral/dental rinse helps to kill a bacteria in your mouth known as Staphylococcus aureus.  This reduces the risk of infection at the surgical site.  Using your CHG oral/dental rinse STEPS: Use your CHG oral/dental rinse after you brush your teeth the night before (at bedtime) and the morning of your surgery.  Follow all directions on your prescription label.  Use the cap on the container to measure 15 ml.  Swish  (gargle if you can) the mouthwash in your mouth for at least 30 seconds, (do not swallow) and spit out.  After you use your CHG rinse, do not rinse your mouth with water, drink or eat.  Please refer to the prescription label for the appropriate time to resume oral intake What side effects might I have using the CHG oral/dental rinse? CHG rinse will stick to plaque on the teeth.  Brush and floss just before use.  Teeth brushing will help avoid staining of plaque during use.       The Week before Surgery        Seven days before Surgery  Check your [facility] medication instructions  Do the exercises provided to you by [facility]   Arrange for a responsible, adult licensed  to take you home after surgery and stay with you for 24 hours.  You will not be permitted to drive yourself home if you have received any anesthetic/sedation  Six days before surgery  Check your [facility] medication instructions  Do the exercises provided to you by [facility]   Start using Chlorhexidene (CHG) body wash if prescribed  Five days before surgery  Check your [facility] medication instructions  Do the exercises provided to you by [facility]   Continue to use CHG body wash if prescribed  Three days before surgery  Check your [facility] medication instructions  Do the exercises provided to you by [facility]   Continue to use CHG body wash if prescribed  Two days before surgery  Check your [facility] medication instructions  Do the exercises provided to you by [facility]   Continue to use CHG body wash if prescribed      The Day before Surgery       Check your [facility] medication and all other [facility] instructions including when to stop eating and drinking  You will be called with your arrival time for surgery in the late afternoon.  If you do not receive a call please reach out to your surgeon's office.  Do not smoke or drink 24 hours before surgery  Prepare items to bring with you to the hospital  Shower with your chlorhexidine wash if prescribed  Brush your teeth and use your chlorhexidine dental rinse if  prescribed    The Day of Surgery       Check your [facility] medication instructions  Ensure you follow the instructions for when to stop eating and drinking  Shower, if prescribed use CHG.  Do not apply any lotions, creams, moisturizers, perfume or deodorant  Brush your teeth and use your CHG dental rinse if prescribed  Wear loose comfortable clothing  Avoid make-up  Remove  jewelry and piercings, consider professional piercing removal with a plastic spacer if needed  Bring photo ID and Insurance card  Bring an accurate medication list that includes medication dose, frequency and allergies  Bring a copy of your advanced directives (will, health care power of )  Bring any devices and controllers as well as medical devices you have been provided with for surgery (CPAP, slings, braces, etc.)  Dentures, eyeglasses, and contacts will be removed before surgery, please bring cases for contacts or glasses

## 2025-02-20 NOTE — CPM/PAT H&P
"CPM/PAT Evaluation       Name: Gen Govea (Gen Govea \"Rich\")  /Age: 1955/69 y.o.     In-Person       Chief Complaint: Laryngoscopy with Co2 Laser     HPI  A 70 y/o M scheduled for Laryngoscopy with Co2 Laser on 2025 with Dr. Tello    PMHX includes  - Exophytic right vocal fold lesion   - CAD  - HLD  - HTN  - Hx of tobacco use disorder   - Hypothyroidism     Presents to [facility] today for perioperative risk stratification and optimization.    Of note, the pt presented to the ED on 2/10/2025 after syncopal episode. The workup at the ED was unremarkable and the plan was to follow up with Cardiology.       Past Medical History:   Diagnosis Date    Coronary artery disease     Disease of thyroid gland     Hyperlipidemia     Hypertension        Past Surgical History:   Procedure Laterality Date    CARDIAC CATHETERIZATION N/A 2024    Procedure: Left Heart Cath;  Surgeon: Gregorio Velasquez MD;  Location: [facility];  Service: Cardiovascular;  Laterality: N/A;    COLONOSCOPY         Patient Sexual activity questions deferred to the physician.    Family History   Problem Relation Name Age of Onset    Alcohol abuse Mother      Cirrhosis Mother      Pancreatitis Father         No Known Allergies    Prior to Admission medications    Medication Sig Start Date End Date Taking? Authorizing Provider   amLODIPine (Norvasc) 10 mg tablet TAKE 1 TABLET BY MOUTH EVERY DAY 2/10/25   Clinton Padilla MD   aspirin 81 mg EC tablet Take 1 tablet (81 mg) by mouth once daily. 22   Historical Provider, MD   carvedilol (Coreg) 3.125 mg tablet Take 1 tablet (3.125 mg) by mouth once daily. 24  Jacob James MD   cholecalciferol (Vitamin D-3) 50 mcg (2,000 unit) capsule Take 1 capsule (50 mcg) by mouth once daily. 23   Historical Provider, MD   levothyroxine (Synthroid, Levoxyl) 125 mcg tablet TAKE 1 TABLET BY MOUTH EVERY DAY 2/10/25   Clinton Padilla MD   omeprazole " (PriLOSEC) 20 mg DR capsule 20 mg by mouth twice daily 12/17/24   Paulie Pedraza DO   PARoxetine (Paxil) 20 mg tablet TAKE 1 TABLET BY MOUTH EVERY DAY 12/30/24   Clinton Padilla MD   simvastatin (Zocor) 40 mg tablet Take 1 tablet (40 mg) by mouth once daily. 5/20/24 5/20/25  Jacob James MD   tamsulosin (Flomax) 0.4 mg 24 hr capsule TAKE 1 CAPSULE BY MOUTH ONCE DAILY. 2/13/25   Clinton Padilla MD   traZODone (Desyrel) 50 mg tablet TAKE 1 TABLET BY MOUTH EVERYDAY AT BEDTIME 2/10/25   Clinton Padilla MD   valsartan-hydrochlorothiazide (Diovan HCT) 320-12.5 mg tablet Take 1 tablet by mouth once daily. 6/28/24 6/28/25  Jacob James MD        PAT ROS:   Constitutional:   neg    Neuro/Psych:   neg    Eyes:   neg    Ears:   Nose:   neg    Mouth:   neg    Throat:    voice change  Neck:   neg    Cardio:   neg    Respiratory:   neg    Endocrine:   neg    GI:   :   Musculoskeletal:   Hematologic:   neg    Skin:  neg        Physical Exam  Vitals and nursing note reviewed.   Constitutional:       Appearance: Normal appearance. He is normal weight.   Eyes:      Extraocular Movements: Extraocular movements intact.      Pupils: Pupils are equal, round, and reactive to light.   Cardiovascular:      Pulses: Normal pulses.   Pulmonary:      Effort: Pulmonary effort is normal. No respiratory distress.      Breath sounds: Normal breath sounds.   Abdominal:      General: Abdomen is flat. Bowel sounds are normal. There is no distension.      Palpations: Abdomen is soft.      Tenderness: There is no abdominal tenderness.   Skin:     General: Skin is warm and dry.   Neurological:      Mental Status: He is alert and oriented to person, place, and time. Mental status is at baseline.   Psychiatric:         Thought Content: Thought content normal.          PAT AIRWAY:   Airway:     Mallampati::  I    TM distance::  >3 FB    Neck ROM::  Full      Testing/Diagnostic:     Patient Specialist/PCP:     Visit Vitals  BP  (!) 173/91   Pulse 81   Temp 36.6 °C (97.9 °F) (Oral)   Ht 1.829 m (6')   Wt 91.4 kg (201 lb 8 oz)   SpO2 96%   BMI 27.33 kg/m²   Smoking Status Former   BSA 2.15 m²       DASI Risk Score      Flowsheet Row Pre-Admission Testing from 2/20/2025 in [facility]   Can you take care of yourself (eat, dress, bathe, or use toilet)?  2.75 filed at 02/20/2025 1411   Can you walk indoors, such as around your house? 1.75 filed at 02/20/2025 1411   Can you walk a block or two on level ground?  2.75 filed at 02/20/2025 1411   Can you climb a flight of stairs or walk up a hill? 5.5 filed at 02/20/2025 1411   Can you run a short distance? 8 filed at 02/20/2025 1411   Can you do light work around the house like dusting or washing dishes? 2.7 filed at 02/20/2025 1411   Can you do moderate work around the house like vacuuming, sweeping floors or carrying groceries? 3.5 filed at 02/20/2025 1411   Can you do heavy work around the house like scrubbing floors or lifting and moving heavy furniture?  8 filed at 02/20/2025 1411   Can you do yard work like raking leaves, weeding or pushing a mower? 4.5 filed at 02/20/2025 1411   Can you have sexual relations? 5.25 filed at 02/20/2025 1411   Can you participate in moderate recreational activities like golf, bowling, dancing, doubles tennis or throwing a baseball or football? 6 filed at 02/20/2025 1411   Can you participate in strenous sports like swimming, singles tennis, football, basketball, or skiing? 7.5 filed at 02/20/2025 1411   DASI SCORE 58.2 filed at 02/20/2025 1411   METS Score (Will be calculated only when all the questions are answered) 9.9 filed at 02/20/2025 1411          Caprini DVT Assessment      Flowsheet Row Pre-Admission Testing from 2/20/2025 in [facility]   DVT Score (IF A SCORE IS NOT CALCULATING, MUST SELECT A BMI TO COMPLETE) 4 filed at 02/20/2025 1602   Surgical Factors Minor surgery planned filed at 02/20/2025 1602   BMI (BMI MUST BE  CHOSEN) 30 or less filed at 02/20/2025 1602          Modified Frailty Index      Flowsheet Row Pre-Admission Testing from 2/20/2025 in [facility]   Non-independent functional status (problems with dressing, bathing, personal grooming, or cooking) 0 filed at 02/20/2025 1603   History of diabetes mellitus  0 filed at 02/20/2025 1603   History of COPD 0 filed at 02/20/2025 1603   History of CHF No filed at 02/20/2025 1603   History of MI 0 filed at 02/20/2025 1603   History of Percutaneous Coronary Intervention, Cardiac Surgery, or Angina No filed at 02/20/2025 1603   Hypertension requiring the use of medication  0.0909 filed at 02/20/2025 1603   Peripheral vascular disease 0 filed at 02/20/2025 1603   Impaired sensorium (cognitive impairement or loss, clouding, or delirium) 0 filed at 02/20/2025 1603   TIA or CVA withouy residual deficit 0 filed at 02/20/2025 1603   Cerebrovascular accident with deficit 0 filed at 02/20/2025 1603   Modified Frailty Index Calculator .0909 filed at 02/20/2025 1603          CHADS2 Stroke Risk  Current as of about an hour ago        N/A 3 to 100%: High Risk   2 to < 3%: Medium Risk   0 to < 2%: Low Risk     Last Change: N/A          This score determines the patient's risk of having a stroke if the patient has atrial fibrillation.        This score is not applicable to this patient. Components are not calculated.          Revised Cardiac Risk Index      Flowsheet Row Pre-Admission Testing from 2/20/2025 in [facility]   High-Risk Surgery (Intraperitoneal, Intrathoracic,Suprainguinal vascular) 0 filed at 02/20/2025 1603   History of ischemic heart disease (History of MI, History of positive exercuse test, Current chest paint considered due to myocardial ischemia, Use of nitrate therapy, ECG with pathological Q Waves) 0 filed at 02/20/2025 1603   History of congestive heart failure (pulmonary edemia, bilateral rales or S3 gallop, Paroxysmal nocturnal  dyspnea, CXR showing pulmonary vascular redistribution) 0 filed at 02/20/2025 1603   History of cerebrovascular disease (Prior TIA or stroke) 0 filed at 02/20/2025 1603   Pre-operative insulin treatment 0 filed at 02/20/2025 1603   Pre-operative creatinine>2 mg/dl 0 filed at 02/20/2025 1603   Revised Cardiac Risk Calculator 0 filed at 02/20/2025 1603          Apfel Simplified Score      Flowsheet Row Pre-Admission Testing from 2/20/2025 in [facility]   Smoking status 1 filed at 02/20/2025 1603   History of motion sickness or PONV  0 filed at 02/20/2025 1603   Use of postoperative opioids 0 filed at 02/20/2025 1603   Gender - Female 0=No filed at 02/20/2025 1603   Apfel Simplified Score Calculator 1 filed at 02/20/2025 1603          Risk Analysis Index Results This Encounter    No data found in the last 10 encounters.       Stop Bang Score      Flowsheet Row Pre-Admission Testing from 2/20/2025 in [facility]   Do you snore loudly? 0 filed at 02/20/2025 1410   Do you often feel tired or fatigued after your sleep? 0 filed at 02/20/2025 1410   Has anyone ever observed you stop breathing in your sleep? 0 filed at 02/20/2025 1410   Do you have or are you being treated for high blood pressure? 1 filed at 02/20/2025 1410   Recent BMI (Calculated) 26.5 filed at 02/20/2025 1410   Is BMI greater than 35 kg/m2? 0=No filed at 02/20/2025 1410   Age older than 50 years old? 1=Yes filed at 02/20/2025 1410   Is your neck circumference greater than 17 inches (Male) or 16 inches (Female)? 1 filed at 02/20/2025 1410   Gender - Male 1=Yes filed at 02/20/2025 1410   STOP-BANG Total Score 4 filed at 02/20/2025 1410          Prodigy: High Risk  Total Score: 16              Prodigy Age Score      Prodigy Gender Score          ARISCAT Score for Postoperative Pulmonary Complications      Flowsheet Row Pre-Admission Testing from 2/20/2025 in [facility]   Age Calculated Score 3 filed at  02/20/2025 1603   Preoperative SpO2 0 filed at 02/20/2025 1603   Respiratory infection in the last month Either upper or lower (i.e., URI, bronchitis, pneumonia), with fever and antibiotic treatment 0 filed at 02/20/2025 1603   Preoperative anemia (Hgb less than 10 g/dl) 0 filed at 02/20/2025 1603   Surgical incision  0 filed at 02/20/2025 1603   Duration of surgery  0 filed at 02/20/2025 1603   Emergency Procedure  0 filed at 02/20/2025 1603   ARISCAT Total Score  3 filed at 02/20/2025 1603          Jacqueline Perioperative Risk for Myocardial Infarction or Cardiac Arrest (GEORGE)      Flowsheet Row Pre-Admission Testing from 2/20/2025 in [facility]   Calculated Age Score 1.36 filed at 02/20/2025 1603   Functional Status  0 filed at 02/20/2025 1603   ASA Class  -1.92 filed at 02/20/2025 1603   Creatinine 0 filed at 02/20/2025 1603   Type of Procedure  0.71 filed at 02/20/2025 1603   GEORGE Total Score  -5.1 filed at 02/20/2025 1603   GEORGE % 0.61 filed at 02/20/2025 1603            Assessment and Plan:     No results found for this or any previous visit (from the past 24 hours).     Assessment and Plan:    A 70 y/o M scheduled for Laryngoscopy with Co2 Laser on 2/27/2025 with Dr. Tello    PMHX includes  - Exophytic right vocal fold lesion   - CAD  - HLD  - HTN  - Hx of tobacco use disorder   - Hypothyroidism     Presents to [facility] today for perioperative risk stratification and optimization.    Of note, the pt presented to the ED on 2/10/2025 after syncopal episode. The workup at the ED was unremarkable and the plan was to follow up with Cardiology.     Neuro:  No neurologic diagnosis, however, the patient is at increased risk for perioperative delirium secondary to  age, sensory impairment  Patient is at increased risk for perioperative CVA secondary to  HTN, increased age    HEENT:  No HEENT diagnosis or significant findings on chart review or clinical presentation and evaluation. No further preoperative  testing/intervention indicated at this time.    Cardiovascular:  - CAD  - HLD  - HTN  - Hx of syncope (pt states he fainted while having a panic attack)  - A referral was made to the Cardiology Office for pre-op clearance.   METS: 9.9  RCRI: 0 points, 3.9%  risk for postoperative MACE   GEORGE: 0.61% risk for 30 day postoperative MACE    Pulmonary:  - Hx of tobacco use disorder     Stop Bang score is 4 placing patient at  risk for HOMER  ARISCAT: <26 points, 1.6% risk of in-hospital postoperative pulmonary complication  PRODIGY: High risk for opioid induced respiratory depression  Pumonary toilet education discussed, patient also provided deep breathing exercises and incentive spirometry educational handout    Renal:   No renal diagnosis, however patient is at increase risk for perioperative renal complications secondary to  Age equal to or greater than 56, HTN    Endocrine:  - Hypothyroidism     Hematologic:  Caprini Score 4, patient at High risk for perioperative DVT.  Patient provided with VTE education/handout.    Gastrointestinal:   No GI diagnosis or significant findings on chart review or clinical presentation and evaluation.   Apfel 1    Infectious disease:   No infectious diagnosis or significant findings on chart review or clinical presentation and evaluation.   Prescription provided for CHG body wash and dental rinse. CHG use instructions reviewed and provided to patient.  Staph screen collected    Musculoskeletal:   No diagnosis or significant findings on chart review or clinical presentation and evaluation.     Anesthesia/Airway:  No anesthesia complications      Medication instructions and NPO guidelines reviewed with the patient.  All questions or concerns discussed and addressed.      Patient seen and staffed with Dr. Carreon

## 2025-02-22 LAB — STAPHYLOCOCCUS SPEC CULT: ABNORMAL

## 2025-02-24 ENCOUNTER — PATIENT MESSAGE (OUTPATIENT)
Dept: OTOLARYNGOLOGY | Facility: HOSPITAL | Age: 70
End: 2025-02-24

## 2025-02-24 ENCOUNTER — TELEPHONE (OUTPATIENT)
Dept: OTOLARYNGOLOGY | Facility: HOSPITAL | Age: 70
End: 2025-02-24

## 2025-02-24 ENCOUNTER — APPOINTMENT (OUTPATIENT)
Dept: CARDIOLOGY | Facility: CLINIC | Age: 70
End: 2025-02-24
Payer: MEDICARE

## 2025-02-24 VITALS
SYSTOLIC BLOOD PRESSURE: 136 MMHG | BODY MASS INDEX: 27.36 KG/M2 | DIASTOLIC BLOOD PRESSURE: 74 MMHG | HEART RATE: 53 BPM | WEIGHT: 202 LBS | HEIGHT: 72 IN

## 2025-02-24 DIAGNOSIS — Z01.818 PREOP EXAMINATION: ICD-10-CM

## 2025-02-24 DIAGNOSIS — I20.0 ANGINA PECTORIS, UNSTABLE (MULTI): ICD-10-CM

## 2025-02-24 DIAGNOSIS — R01.1 SYSTOLIC MURMUR: ICD-10-CM

## 2025-02-24 DIAGNOSIS — E78.2 MIXED HYPERLIPIDEMIA: ICD-10-CM

## 2025-02-24 DIAGNOSIS — Z87.891 FORMER SMOKER: ICD-10-CM

## 2025-02-24 DIAGNOSIS — T67.1XXA HEAT SYNCOPE, INITIAL ENCOUNTER: ICD-10-CM

## 2025-02-24 DIAGNOSIS — Z01.818 PRE-OPERATIVE CLEARANCE: Primary | ICD-10-CM

## 2025-02-24 DIAGNOSIS — R07.9 CHEST PAIN WITH NORMAL CORONARY ANGIOGRAPHY: ICD-10-CM

## 2025-02-24 DIAGNOSIS — R55 POSTURAL DIZZINESS WITH PRESYNCOPE: ICD-10-CM

## 2025-02-24 DIAGNOSIS — Z41.9 SURGERY, ELECTIVE: ICD-10-CM

## 2025-02-24 DIAGNOSIS — R06.02 SHORTNESS OF BREATH: ICD-10-CM

## 2025-02-24 DIAGNOSIS — I10 HYPERTENSION, UNSPECIFIED TYPE: ICD-10-CM

## 2025-02-24 DIAGNOSIS — R42 POSTURAL DIZZINESS WITH PRESYNCOPE: ICD-10-CM

## 2025-02-24 PROCEDURE — 3078F DIAST BP <80 MM HG: CPT | Performed by: INTERNAL MEDICINE

## 2025-02-24 PROCEDURE — 93000 ELECTROCARDIOGRAM COMPLETE: CPT | Performed by: INTERNAL MEDICINE

## 2025-02-24 PROCEDURE — 99215 OFFICE O/P EST HI 40 MIN: CPT | Performed by: INTERNAL MEDICINE

## 2025-02-24 PROCEDURE — 1159F MED LIST DOCD IN RCRD: CPT | Performed by: INTERNAL MEDICINE

## 2025-02-24 PROCEDURE — 3008F BODY MASS INDEX DOCD: CPT | Performed by: INTERNAL MEDICINE

## 2025-02-24 PROCEDURE — 1036F TOBACCO NON-USER: CPT | Performed by: INTERNAL MEDICINE

## 2025-02-24 PROCEDURE — 3075F SYST BP GE 130 - 139MM HG: CPT | Performed by: INTERNAL MEDICINE

## 2025-02-24 NOTE — PROGRESS NOTES
CARDIOLOGY NEW PATIENT OFFICE VISIT    Patient:  Gen Govea  YOB: 1955  MRN: [id number]       Chief Complaint/Active Symptoms:       Gen Govea is a 69 y.o. male who is being seen today at the request of ENT surgery because of a throat surgery scheduled next week at [facility] (formerly [facility]).  Patient actually is not a new patient to the practice.  He follows regularly with Dr. James.  He had a heart cath in May which I have listed down below and showed only minimal CAD.  He is having no chest pain.  He has normal LV function.  His EKG is normal.  He claims no other symptoms or problems from a cardiac perspective.  He did question as to why he was seeing me today since he has a regular cardiologist but I propose that this was probably just a scheduling situation since surgery was intermittent.    Chief Complaint   Patient presents with    Pre-op Clearance     POC on 2/27/2025 for MICROLARYNGOSCOPY, BRONCHOSCOPY, CO2 LASER, BIOPSY with Dr. Julia Tello MD       History of Present Illness:   HPI      Allergies:     No Known Allergies     Outpatient Medications:     Current Outpatient Medications   Medication Instructions    amLODIPine (NORVASC) 10 mg, oral, Daily    aspirin 81 mg EC tablet 1 tablet, Daily    carvedilol (COREG) 3.125 mg, oral, Daily    chlorhexidine (Hibiclens) 4 % external liquid 1 Application, Topical, Daily, Use per CPM/PAT provided instructions    chlorhexidine (Peridex) 0.12 % solution 15 mL, Mouth/Throat, Daily    cholecalciferol (Vitamin D-3) 50 mcg (2,000 unit) capsule 1 capsule, Daily    levothyroxine (SYNTHROID, LEVOXYL) 125 mcg, oral, Daily    omeprazole (PriLOSEC) 20 mg DR capsule 20 mg by mouth twice daily    PARoxetine (PAXIL) 20 mg, oral, Daily    simvastatin (ZOCOR) 40 mg, oral, Daily    tamsulosin (FLOMAX) 0.4 mg, oral, Daily    traZODone (DESYREL) 50 mg, oral, Nightly    valsartan-hydrochlorothiazide (Diovan HCT) 320-12.5 mg tablet 1 tablet, oral, Daily         Past Medical History:     Past Medical History:   Diagnosis Date    Coronary artery disease     Disease of thyroid gland     Hyperlipidemia     Hypertension        Social History:     Social History     Tobacco Use    Smoking status: Former     Current packs/day: 0.00     Types: Cigarettes     Quit date:      Years since quittin.1    Smokeless tobacco: Never   Substance Use Topics    Alcohol use: Yes     Alcohol/week: 2.0 - 3.0 standard drinks of alcohol     Types: 2 - 3 Cans of beer per week     Comment: quit 6 month ago- 2024    Drug use: Not Currently       Family History:        Family History   Problem Relation Name Age of Onset    Alcohol abuse Mother      Cirrhosis Mother      Pancreatitis Father         Review of Systems:     Review of Systems   All other systems reviewed and are negative.      Objective:     Vitals:    25 1346   BP: 150/80   Pulse: 53       Vitals:    25 1346   Weight: 91.6 kg (202 lb)       Physical Examination:   Constitutional:       Appearance: Healthy appearance. Not in distress.   Neck:      Vascular: No JVR. JVD normal.   Pulmonary:      Effort: Pulmonary effort is normal.      Breath sounds: Normal breath sounds. No wheezing. No rhonchi. No rales.   Chest:      Chest wall: Not tender to palpatation.   Cardiovascular:      PMI at left midclavicular line. Normal rate. Regular rhythm. Normal S1. Normal S2.       Murmurs: There is no murmur.      No gallop.  No click. No rub.   Pulses:     Intact distal pulses.   Edema:     Peripheral edema absent.   Abdominal:      General: Bowel sounds are normal.      Palpations: Abdomen is soft.      Tenderness: There is no abdominal tenderness.   Musculoskeletal: Normal range of motion.         General: No tenderness. Skin:     General: Skin is warm and dry.   Neurological:      General: No focal deficit present.      Mental Status: Alert and oriented to person, place and time.            Lab:     CBC:   Lab Results  "  Component Value Date    WBC 8.5 02/10/2025    RBC 5.05 02/10/2025    HGB 14.1 02/10/2025    HCT 42.4 02/10/2025     02/10/2025        CMP:    Lab Results   Component Value Date     02/10/2025    K 3.3 (L) 02/10/2025    CL 99 02/10/2025    CO2 28 02/10/2025    BUN 13 02/10/2025    CREATININE 0.80 02/10/2025    GLUCOSE 157 (H) 02/10/2025    CALCIUM 9.3 02/10/2025       Magnesium:    No results found for: \"MG\"    Lipid Profile:    Lab Results   Component Value Date    TRIG 77 03/16/2024    HDL 47.1 03/16/2024    LDLCALC 109 (H) 03/16/2024       TSH:    Lab Results   Component Value Date    TSH 2.95 02/10/2025       BNP:   No results found for: \"BNP\"     PT/INR:    Lab Results   Component Value Date    PROTIME 12.3 02/10/2025    INR 1.1 02/10/2025       HgBA1c:    No results found for: \"HGBA1C\"    BMP:  Lab Results   Component Value Date     02/10/2025     01/28/2025     05/23/2024    K 3.3 (L) 02/10/2025    K 3.7 01/28/2025    K 4.0 05/23/2024    CL 99 02/10/2025     01/28/2025     05/23/2024    CO2 28 02/10/2025    CO2 31 01/28/2025    CO2 31 05/23/2024    BUN 13 02/10/2025    BUN 13 01/28/2025    BUN 16 05/23/2024    CREATININE 0.80 02/10/2025    CREATININE 0.69 01/28/2025    CREATININE 0.65 05/23/2024       CBC:  Lab Results   Component Value Date    WBC 8.5 02/10/2025    WBC 5.8 01/28/2025    WBC 5.0 05/23/2024    RBC 5.05 02/10/2025    RBC 4.97 01/28/2025    RBC 5.13 05/23/2024    HGB 14.1 02/10/2025    HGB 14.1 01/28/2025    HGB 14.8 05/23/2024    HCT 42.4 02/10/2025    HCT 42.6 01/28/2025    HCT 44.5 05/23/2024    MCV 84 02/10/2025    MCV 86 01/28/2025    MCV 87 05/23/2024    MCH 27.9 02/10/2025    MCH 28.4 01/28/2025    MCH 28.8 05/23/2024    MCHC 33.3 02/10/2025    MCHC 33.1 01/28/2025    MCHC 33.3 05/23/2024    RDW 12.1 02/10/2025    RDW 12.1 01/28/2025    RDW 12.5 05/23/2024     02/10/2025     01/28/2025     05/23/2024       Cardiac Enzymes:  "   Lab Results   Component Value Date    TROPHS 4 02/10/2025    TROPHS 4 02/10/2025       Hepatic Function Panel:    Lab Results   Component Value Date    ALKPHOS 51 02/10/2025    ALT 20 02/10/2025    AST 19 02/10/2025    PROT 7.5 02/10/2025    BILITOT 1.8 (H) 02/10/2025         Diagnostic Studies:     ECG 12 lead    Result Date: 2/19/2025  Normal sinus rhythm ST abnormality, possible digitalis effect Abnormal ECG When compared with ECG of 31-JAN-2025 12:23, Questionable change in QRS axis Confirmed by Paulie Woody ([id number]) on 2/19/2025 1:37:34 PM    CT angio chest for pulmonary embolism    Result Date: 2/11/2025  Interpreted By:  Aurelia Barrera, STUDY: CT ANGIO CHEST FOR PULMONARY EMBOLISM;  2/10/2025 10:27 pm   INDICATION: Signs/Symptoms:Syncope, elevated D dimer   COMPARISON: CT cardiac scoring 03/20/2023   ACCESSION NUMBER(S): [id number]   ORDERING CLINICIAN: GOPI JONES   TECHNIQUE: Helical data acquisition of the chest was obtained following the uneventful administration of intravenous contrast material. Images were reformatted in axial, coronal, and sagittal planes. MIP images were created and reviewed.   FINDINGS: POTENTIAL LIMITATIONS OF THE STUDY: Motion artifact.  There is streak artifact from the intravenous contrast at the SVC.   HEART AND VESSELS: No discrete filling defects within the main pulmonary artery or its branches.   No thoracic aortic aneurysm.   The heart is not enlarged.    No evidence of pericardial effusion.   MEDIASTINUM AND JONAS, LOWER NECK AND AXILLA: The visualized thyroid gland is within normal limits.   No evidence of thoracic lymphadenopathy by CT criteria.   LUNGS AND AIRWAYS: The trachea and central airways are patent.   The evaluation of the lungs is degraded by motion artifact. No consolidation, pleural effusion or pneumothorax.  Redemonstration of subsegmental atelectasis/scarring at the left lower lobe.   UPPER ABDOMEN: The visualized subdiaphragmatic structures  demonstrate no acute findings.   CHEST WALL AND OSSEOUS STRUCTURES: Multilevel degenerative changes at the spine.       1.  No evidence of pulmonary emboli. No consolidation or pleural effusion.       MACRO: None.   Signed by: Aurelia Barrera 2/11/2025 12:15 AM Dictation workstation:   [id number]    CT brain attack angio head and neck W and WO IV contrast    Result Date: 2/10/2025  Interpreted By:  Magali Ochoa, STUDY: CT BRAIN ATTACK ANGIO HEAD AND NECK W AND WO IV CONTRAST;  2/10/2025 6:29 pm   INDICATION: Signs/Symptoms:headache, dizziness   COMPARISON: Correlation with noncontrast head CT of the same day   ACCESSION NUMBER(S): [id number]   ORDERING CLINICIAN: YAIR BARAJAS   TECHNIQUE: Following IV contrast administration, a CT angiography of the head and neck was performed.  Triplanar MIPS  and 3D reconstructions of the [place]  and neck were generated.   FINDINGS: CTA NECK:   The great vessel origins are patent with no significant stenosis.   LEFT VERTEBRAL ARTERY:  No hemodynamically significant stenosis, occlusion, or dissection.   LEFT COMMON/INTERNAL CAROTID ARTERY:  No hemodynamically significant stenosis, occlusion, or dissection.   RIGHT VERTEBRAL ARTERY:  No hemodynamically significant stenosis, occlusion, or dissection.   RIGHT COMMON/INTERNAL CAROTID ARTERY:  No hemodynamically significant stenosis, occlusion, or dissection.     The neck soft tissues show no evidence of mass, fluid collection, or enlarged lymph nodes.   There is no acute osseous abnormality. 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 Facet and uncovertebral arthropathy results in left neural foraminal stenosis at C3-4 and C4-5. The imaged lungs are clear.     CTA HEAD:   ANTERIOR CIRCULATION: No occlusion or aneurysm.   - Internal Carotid Arteries: Patent, with no hemodynamically significant stenosis.   - Middle Cerebral Arteries: Patent, with no  hemodynamically significant stenosis.   - Anterior Cerebral Arteries: Patent, with no hemodynamically significant stenosis.     POSTERIOR CIRCULATION: No occlusion or aneurysm.   - Intracranial Vertebral Arteries: Patent, with no hemodynamically significant stenosis.   - Basilar Artery: Patent, with no hemodynamically significant stenosis.   - Posterior Cerebral Arteries: Patent, with no hemodynamically significant stenosis.   No arteriovenous malformation is visualized. No pathologic intracranial enhancement or discrete mass. The dural venous sinuses are patent.   MIPS and 3D reconstructions confirm the above findings.       1. No intracranial large vessel arterial branch occlusion or hemodynamically significant stenosis.   2. No significant stenosis of the cervical carotid or vertebral arteries.   MACRO: None   Signed by: Magali Ochoa 2/10/2025 6:46 PM Dictation workstation:   [id number]    CT brain attack head wo IV contrast    Result Date: 2/10/2025  Interpreted By:  Magali Ochoa, STUDY: CT BRAIN ATTACK HEAD WO IV CONTRAST;  2/10/2025 6:28 pm   INDICATION: Signs/Symptoms:Stroke Evaluation.   COMPARISON: 10/16/2017   ACCESSION NUMBER(S): [id number]   ORDERING CLINICIAN: YAIR BARAJAS   TECHNIQUE: Axial noncontrast CT images of the head.   FINDINGS: BRAIN PARENCHYMA: Minimal deep and periventricular white matter hypodensities are nonspecific, but favored to represent chronic small vessel ischemic changes.  Gray-white matter interfaces are preserved. No mass effect or midline shift.   HEMORRHAGE: No acute intracranial hemorrhage. VENTRICLES and EXTRA-AXIAL SPACES: The ventricles and sulci are within normal limits in size for brain volume. No abnormal extraaxial fluid collection. EXTRACRANIAL SOFT TISSUES: Within normal limits. PARANASAL SINUSES/MASTOIDS:  The visualized paranasal sinuses and mastoid air cells are aerated. CALVARIUM: No depressed skull fracture. No destructive osseous lesion.   OTHER  FINDINGS: None.       No acute intracranial abnormality.     MACRO: Magali Ochoa discussed the significance and urgency of this critical finding by telephone with  Dr. Bergman on 2/10/2025 at 6:36 pm. (**-RCF-**) Findings:  See findings.   Signed by: Magali Ochoa 2/10/2025 6:38 PM Dictation workstation:   [id number]    Cardiac Cath Post Procedure Notes:  Post Procedure Diagnosis: Mild CAD.  Blood Loss:               Estimated blood loss during the procedure was 0 mls.  Specimens Removed:        Number of specimen(s) removed: none.     ____________________________________________________________________________________  CONCLUSIONS:   1. The entire Left Main: <10% stenosis.   2. Mid LAD Lesion: The percent stenosis is 10-30%.   3. Mid CX Lesion: The percent stenosis is 10-30%.   4. Proximal, mid and distal RCA Lesion: The percent stenosis is 10-30%.   5. The Left Ventricular Ejection Fraction is 60%.     ICD 10 Codes:  Atherosclerotic heart disease of native coronary artery with unspecified angina pectoris-I25.119     CPT Codes:  Moderate Sedation Services initial 15 minutes patient >5 years-[id number]; Left Heart Cath (visualization of coronaries) and [id number]     [id number] Gregorio Gutierrez MD  Performing Physician  Electronically signed by [id number] Gregorio Gutierrez MD on 5/28/2024 at 8:43:15  AM              ** Final **  Radiology:     No orders to display       Assessment/Plan:           Assessment:       69-year-old gentleman awaiting ENT surgery for some type of throat issue possibly polyps at [facility] next week.    Meds, vitals, examination as noted.    Chart reviewed in details cussed the patient at length.    Impression:  Minimal CAD asymptomatic  Normal LV function  Hypertension  Dyslipidemia  Remote smoker  Plan:   Recommendation:  Patient's EKG is normal, patient's examination is normal, patient's catheterization done in May 2024 showed minimal disease with normal LV function  Current  circumstances and findings would lead to a low overall risk which is perfectly acceptable for proposed surgery  No additional measures need to be taken  Resume medications after surgery  Follow-up for usual visit with his primary cardiologist here in [place]  Call should any issues problems or other concerns arise in the meantime

## 2025-02-24 NOTE — PATIENT INSTRUCTIONS
Continue same medications/treatment.  Patient educated on proper medication use.  Patient educated on risk factor modification.  Please bring any lab results from other providers/physicians to your next appointment.    Please bring all medicines, vitamins, and herbal supplements with you when you come to the office.    Prescriptions will not be filled unless you are compliant with your follow up appointments or have a follow up appointment scheduled as per instruction of your physician. Refills should be requested at the time of your visit.    Follow up as scheduled with Dr. Jacob James M.D.    Patient an acceptable risk from a cardiac standpoint to have throat surgery    I, MELO ANTONIO RN, AM SCRIBING FOR AND IN THE PRESENCE OF DR. KRISTINA GAUTAM, DO, FACC

## 2025-02-24 NOTE — TELEPHONE ENCOUNTER
Returned pt's call - no answer - voicemail left informing pt I would send him a mychart message and he can call the office back with any questions if he needs to.

## 2025-02-26 NOTE — DISCHARGE INSTRUCTIONS
Postoperative Instructions: Microdirect Laryngoscopy    General: Pain of the nose and throat can be normal after these procedures. A small amount of blood from the nose or mouth can be expected.  Diet: Start with liquids after anesthesia. Soft foods may feel best for the first 1-2 days following your procedure. Soft foods include soup, noodles, scrambled eggs, oatmeal, yogurt, smoothies, applesauce, mashed potatoes, pasta or ice cream. Avoid toast, chips, hard crusted breads, and steak or similar meats. After your throat begins to feel less sore, you can continue your normal diet. Most importantly, stay hydrated.  Voice Rest: Please rest your voice for 24 hours after surgery this means no talking, even whispering. Do your best to limit coughing.  Pain Control: You may experience a mild to moderate sore throat or tongue for several days following the procedure. The throat pain may also seem to cause earaches from referred pain. We encourage use of acetaminophen (Tylenol) and /or ibuprofen (Motrin/Advil) for most pain control. These two medications can be taken together or can be alternated. We will sometimes prescribe you something stronger for pain for “break through.”  Use it only as needed. Do not drive while taking any narcotic pain medications.  Follow-up: Your follow-up with your doctor should be scheduled 3-4 weeks following surgery. If a biopsy was preformed, results will usually be available in the system 7 days following the surgery. You will be informed of the results.   Please call the office or go to the emergency room if you experience any of the following: difficulty breathing, inability to swallow, severe chest pain, fever great than 101 degrees, significant bleeding, or any new/concerning symptoms.  Contact:  During office hours between 8 AM and 5 PM, please call Dr. Tello's office at [phone].  If you have an emergency over night or on  the weekend, please call [phone] and ask the  to connect you to the ENT resident on call.

## 2025-02-27 ENCOUNTER — ANESTHESIA EVENT (OUTPATIENT)
Dept: OPERATING ROOM | Facility: HOSPITAL | Age: 70
End: 2025-02-27
Payer: MEDICARE

## 2025-02-27 ENCOUNTER — ANESTHESIA (OUTPATIENT)
Dept: OPERATING ROOM | Facility: HOSPITAL | Age: 70
End: 2025-02-27
Payer: MEDICARE

## 2025-02-27 ENCOUNTER — HOSPITAL ENCOUNTER (OUTPATIENT)
Facility: HOSPITAL | Age: 70
Setting detail: OUTPATIENT SURGERY
Discharge: HOME | End: 2025-02-27
Attending: OTOLARYNGOLOGY | Admitting: OTOLARYNGOLOGY
Payer: MEDICARE

## 2025-02-27 VITALS
DIASTOLIC BLOOD PRESSURE: 80 MMHG | HEART RATE: 93 BPM | TEMPERATURE: 98.1 F | WEIGHT: 200.4 LBS | SYSTOLIC BLOOD PRESSURE: 158 MMHG | BODY MASS INDEX: 27.14 KG/M2 | HEIGHT: 72 IN | RESPIRATION RATE: 16 BRPM | OXYGEN SATURATION: 96 %

## 2025-02-27 DIAGNOSIS — G89.18 POST-OP PAIN: Primary | ICD-10-CM

## 2025-02-27 DIAGNOSIS — R49.0 DYSPHONIA: ICD-10-CM

## 2025-02-27 DIAGNOSIS — J38.7 LARYNGEAL MASS: ICD-10-CM

## 2025-02-27 PROCEDURE — 2500000004 HC RX 250 GENERAL PHARMACY W/ HCPCS (ALT 636 FOR OP/ED): Performed by: OTOLARYNGOLOGY

## 2025-02-27 PROCEDURE — 7100000001 HC RECOVERY ROOM TIME - INITIAL BASE CHARGE: Performed by: OTOLARYNGOLOGY

## 2025-02-27 PROCEDURE — 3700000002 HC GENERAL ANESTHESIA TIME - EACH INCREMENTAL 1 MINUTE: Performed by: OTOLARYNGOLOGY

## 2025-02-27 PROCEDURE — 2500000005 HC RX 250 GENERAL PHARMACY W/O HCPCS: Performed by: ANESTHESIOLOGIST ASSISTANT

## 2025-02-27 PROCEDURE — 3600000002 HC OR TIME - INITIAL BASE CHARGE - PROCEDURE LEVEL TWO: Performed by: OTOLARYNGOLOGY

## 2025-02-27 PROCEDURE — A31571 PR LARYNGOSCOPY,DIRECT,SCOPE,INJ CORDS: Performed by: ANESTHESIOLOGIST ASSISTANT

## 2025-02-27 PROCEDURE — 3600000008 HC OR TIME - EACH INCREMENTAL 1 MINUTE - PROCEDURE LEVEL THREE: Performed by: OTOLARYNGOLOGY

## 2025-02-27 PROCEDURE — 31541 LARYNSCOP W/TUMR EXC + SCOPE: CPT | Performed by: OTOLARYNGOLOGY

## 2025-02-27 PROCEDURE — 2500000005 HC RX 250 GENERAL PHARMACY W/O HCPCS: Performed by: ANESTHESIOLOGY

## 2025-02-27 PROCEDURE — 7100000009 HC PHASE TWO TIME - INITIAL BASE CHARGE: Performed by: OTOLARYNGOLOGY

## 2025-02-27 PROCEDURE — 2500000005 HC RX 250 GENERAL PHARMACY W/O HCPCS: Performed by: OTOLARYNGOLOGY

## 2025-02-27 PROCEDURE — A31571 PR LARYNGOSCOPY,DIRECT,SCOPE,INJ CORDS: Performed by: ANESTHESIOLOGY

## 2025-02-27 PROCEDURE — 3700000001 HC GENERAL ANESTHESIA TIME - INITIAL BASE CHARGE: Performed by: OTOLARYNGOLOGY

## 2025-02-27 PROCEDURE — 2720000007 HC OR 272 NO HCPCS: Performed by: OTOLARYNGOLOGY

## 2025-02-27 PROCEDURE — 2500000004 HC RX 250 GENERAL PHARMACY W/ HCPCS (ALT 636 FOR OP/ED): Performed by: ANESTHESIOLOGIST ASSISTANT

## 2025-02-27 PROCEDURE — 2500000004 HC RX 250 GENERAL PHARMACY W/ HCPCS (ALT 636 FOR OP/ED): Performed by: ANESTHESIOLOGY

## 2025-02-27 PROCEDURE — 2500000001 HC RX 250 WO HCPCS SELF ADMINISTERED DRUGS (ALT 637 FOR MEDICARE OP): Performed by: ANESTHESIOLOGY

## 2025-02-27 PROCEDURE — 7100000002 HC RECOVERY ROOM TIME - EACH INCREMENTAL 1 MINUTE: Performed by: OTOLARYNGOLOGY

## 2025-02-27 PROCEDURE — 7100000010 HC PHASE TWO TIME - EACH INCREMENTAL 1 MINUTE: Performed by: OTOLARYNGOLOGY

## 2025-02-27 PROCEDURE — 3600000007 HC OR TIME - EACH INCREMENTAL 1 MINUTE - PROCEDURE LEVEL TWO: Performed by: OTOLARYNGOLOGY

## 2025-02-27 PROCEDURE — 3600000003 HC OR TIME - INITIAL BASE CHARGE - PROCEDURE LEVEL THREE: Performed by: OTOLARYNGOLOGY

## 2025-02-27 PROCEDURE — 88305 TISSUE EXAM BY PATHOLOGIST: CPT | Mod: TC,SUR | Performed by: OTOLARYNGOLOGY

## 2025-02-27 RX ORDER — LIDOCAINE HCL/PF 100 MG/5ML
SYRINGE (ML) INTRAVENOUS AS NEEDED
Status: DISCONTINUED | OUTPATIENT
Start: 2025-02-27 | End: 2025-02-27

## 2025-02-27 RX ORDER — ROCURONIUM BROMIDE 10 MG/ML
INJECTION, SOLUTION INTRAVENOUS AS NEEDED
Status: DISCONTINUED | OUTPATIENT
Start: 2025-02-27 | End: 2025-02-27

## 2025-02-27 RX ORDER — HYDROCHLOROTHIAZIDE 12.5 MG/1
12.5 CAPSULE ORAL DAILY
COMMUNITY

## 2025-02-27 RX ORDER — METOCLOPRAMIDE HYDROCHLORIDE 5 MG/ML
10 INJECTION INTRAMUSCULAR; INTRAVENOUS ONCE AS NEEDED
Status: DISCONTINUED | OUTPATIENT
Start: 2025-02-27 | End: 2025-02-27 | Stop reason: HOSPADM

## 2025-02-27 RX ORDER — LIDOCAINE HYDROCHLORIDE 40 MG/ML
SOLUTION TOPICAL AS NEEDED
Status: DISCONTINUED | OUTPATIENT
Start: 2025-02-27 | End: 2025-02-27

## 2025-02-27 RX ORDER — ACETAMINOPHEN 325 MG/1
650 TABLET ORAL EVERY 4 HOURS PRN
Status: DISCONTINUED | OUTPATIENT
Start: 2025-02-27 | End: 2025-02-27 | Stop reason: HOSPADM

## 2025-02-27 RX ORDER — HYDROMORPHONE HYDROCHLORIDE 0.2 MG/ML
0.2 INJECTION INTRAMUSCULAR; INTRAVENOUS; SUBCUTANEOUS EVERY 5 MIN PRN
Status: DISCONTINUED | OUTPATIENT
Start: 2025-02-27 | End: 2025-02-27 | Stop reason: HOSPADM

## 2025-02-27 RX ORDER — OXYCODONE HYDROCHLORIDE 5 MG/1
5 TABLET ORAL EVERY 4 HOURS PRN
Status: DISCONTINUED | OUTPATIENT
Start: 2025-02-27 | End: 2025-02-27 | Stop reason: HOSPADM

## 2025-02-27 RX ORDER — DIPHENHYDRAMINE HYDROCHLORIDE 50 MG/ML
12.5 INJECTION INTRAMUSCULAR; INTRAVENOUS ONCE AS NEEDED
Status: DISCONTINUED | OUTPATIENT
Start: 2025-02-27 | End: 2025-02-27 | Stop reason: HOSPADM

## 2025-02-27 RX ORDER — SODIUM CHLORIDE 0.9 G/100ML
INJECTION, SOLUTION IRRIGATION AS NEEDED
Status: DISCONTINUED | OUTPATIENT
Start: 2025-02-27 | End: 2025-02-27 | Stop reason: HOSPADM

## 2025-02-27 RX ORDER — EPINEPHRINE 1 MG/ML
INJECTION, SOLUTION, CONCENTRATE INTRAVENOUS AS NEEDED
Status: DISCONTINUED | OUTPATIENT
Start: 2025-02-27 | End: 2025-02-27 | Stop reason: HOSPADM

## 2025-02-27 RX ORDER — OXYCODONE HYDROCHLORIDE 5 MG/1
5 TABLET ORAL EVERY 6 HOURS PRN
Qty: 15 TABLET | Refills: 0 | Status: SHIPPED | OUTPATIENT
Start: 2025-02-27

## 2025-02-27 RX ORDER — HYDRALAZINE HYDROCHLORIDE 20 MG/ML
5 INJECTION INTRAMUSCULAR; INTRAVENOUS EVERY 30 MIN PRN
Status: DISCONTINUED | OUTPATIENT
Start: 2025-02-27 | End: 2025-02-27 | Stop reason: HOSPADM

## 2025-02-27 RX ORDER — SODIUM CHLORIDE, SODIUM LACTATE, POTASSIUM CHLORIDE, CALCIUM CHLORIDE 600; 310; 30; 20 MG/100ML; MG/100ML; MG/100ML; MG/100ML
100 INJECTION, SOLUTION INTRAVENOUS CONTINUOUS
Status: ACTIVE | OUTPATIENT
Start: 2025-02-27 | End: 2025-02-27

## 2025-02-27 RX ORDER — OXYCODONE HYDROCHLORIDE 5 MG/1
10 TABLET ORAL EVERY 4 HOURS PRN
Status: DISCONTINUED | OUTPATIENT
Start: 2025-02-27 | End: 2025-02-27 | Stop reason: HOSPADM

## 2025-02-27 RX ORDER — FENTANYL CITRATE 50 UG/ML
INJECTION, SOLUTION INTRAMUSCULAR; INTRAVENOUS AS NEEDED
Status: DISCONTINUED | OUTPATIENT
Start: 2025-02-27 | End: 2025-02-27

## 2025-02-27 RX ORDER — METHOCARBAMOL 100 MG/ML
1000 INJECTION, SOLUTION INTRAMUSCULAR; INTRAVENOUS ONCE
Status: COMPLETED | OUTPATIENT
Start: 2025-02-27 | End: 2025-02-27

## 2025-02-27 RX ORDER — LABETALOL HYDROCHLORIDE 5 MG/ML
5 INJECTION, SOLUTION INTRAVENOUS ONCE AS NEEDED
Status: DISCONTINUED | OUTPATIENT
Start: 2025-02-27 | End: 2025-02-27 | Stop reason: HOSPADM

## 2025-02-27 RX ORDER — ALBUTEROL SULFATE 0.83 MG/ML
2.5 SOLUTION RESPIRATORY (INHALATION) ONCE AS NEEDED
Status: DISCONTINUED | OUTPATIENT
Start: 2025-02-27 | End: 2025-02-27 | Stop reason: HOSPADM

## 2025-02-27 RX ORDER — MIDAZOLAM HYDROCHLORIDE 1 MG/ML
INJECTION INTRAMUSCULAR; INTRAVENOUS AS NEEDED
Status: DISCONTINUED | OUTPATIENT
Start: 2025-02-27 | End: 2025-02-27

## 2025-02-27 RX ORDER — SODIUM CHLORIDE, SODIUM LACTATE, POTASSIUM CHLORIDE, CALCIUM CHLORIDE 600; 310; 30; 20 MG/100ML; MG/100ML; MG/100ML; MG/100ML
INJECTION, SOLUTION INTRAVENOUS CONTINUOUS PRN
Status: DISCONTINUED | OUTPATIENT
Start: 2025-02-27 | End: 2025-02-27

## 2025-02-27 RX ORDER — PROPOFOL 10 MG/ML
INJECTION, EMULSION INTRAVENOUS AS NEEDED
Status: DISCONTINUED | OUTPATIENT
Start: 2025-02-27 | End: 2025-02-27

## 2025-02-27 RX ORDER — DEXAMETHASONE SODIUM PHOSPHATE 10 MG/ML
INJECTION INTRAMUSCULAR; INTRAVENOUS AS NEEDED
Status: DISCONTINUED | OUTPATIENT
Start: 2025-02-27 | End: 2025-02-27 | Stop reason: HOSPADM

## 2025-02-27 RX ORDER — LIDOCAINE HYDROCHLORIDE 10 MG/ML
0.1 INJECTION, SOLUTION INFILTRATION; PERINEURAL ONCE
Status: DISCONTINUED | OUTPATIENT
Start: 2025-02-27 | End: 2025-02-27 | Stop reason: HOSPADM

## 2025-02-27 RX ORDER — ONDANSETRON HYDROCHLORIDE 2 MG/ML
INJECTION, SOLUTION INTRAVENOUS AS NEEDED
Status: DISCONTINUED | OUTPATIENT
Start: 2025-02-27 | End: 2025-02-27

## 2025-02-27 RX ORDER — ONDANSETRON HYDROCHLORIDE 2 MG/ML
4 INJECTION, SOLUTION INTRAVENOUS ONCE AS NEEDED
Status: DISCONTINUED | OUTPATIENT
Start: 2025-02-27 | End: 2025-02-27 | Stop reason: HOSPADM

## 2025-02-27 RX ADMIN — FENTANYL CITRATE 50 MCG: 50 INJECTION, SOLUTION INTRAMUSCULAR; INTRAVENOUS at 09:22

## 2025-02-27 RX ADMIN — Medication 6 L/MIN: at 11:14

## 2025-02-27 RX ADMIN — MIDAZOLAM HYDROCHLORIDE 2 MG: 1 INJECTION, SOLUTION INTRAMUSCULAR; INTRAVENOUS at 09:15

## 2025-02-27 RX ADMIN — PROPOFOL 100 MG: 10 INJECTION, EMULSION INTRAVENOUS at 09:22

## 2025-02-27 RX ADMIN — OXYCODONE 5 MG: 5 TABLET ORAL at 13:05

## 2025-02-27 RX ADMIN — SUGAMMADEX 200 MG: 100 INJECTION, SOLUTION INTRAVENOUS at 11:04

## 2025-02-27 RX ADMIN — HYDROMORPHONE HYDROCHLORIDE 0.2 MG: 0.2 INJECTION, SOLUTION INTRAMUSCULAR; INTRAVENOUS; SUBCUTANEOUS at 11:53

## 2025-02-27 RX ADMIN — PROPOFOL 150 MCG/KG/MIN: 10 INJECTION, EMULSION INTRAVENOUS at 09:31

## 2025-02-27 RX ADMIN — FENTANYL CITRATE 50 MCG: 50 INJECTION, SOLUTION INTRAMUSCULAR; INTRAVENOUS at 09:35

## 2025-02-27 RX ADMIN — LIDOCAINE HYDROCHLORIDE 100 MG: 20 INJECTION INTRAVENOUS at 09:22

## 2025-02-27 RX ADMIN — SODIUM CHLORIDE, POTASSIUM CHLORIDE, SODIUM LACTATE AND CALCIUM CHLORIDE: 600; 310; 30; 20 INJECTION, SOLUTION INTRAVENOUS at 09:11

## 2025-02-27 RX ADMIN — ONDANSETRON 4 MG: 2 INJECTION INTRAMUSCULAR; INTRAVENOUS at 10:04

## 2025-02-27 RX ADMIN — METHOCARBAMOL 1000 MG: 1000 INJECTION, SOLUTION INTRAMUSCULAR; INTRAVENOUS at 11:38

## 2025-02-27 RX ADMIN — LIDOCAINE HYDROCHLORIDE 4 ML: 40 SOLUTION TOPICAL at 10:58

## 2025-02-27 RX ADMIN — ROCURONIUM BROMIDE 65 MG: 10 INJECTION INTRAVENOUS at 09:23

## 2025-02-27 RX ADMIN — DEXAMETHASONE SODIUM PHOSPHATE 10 MG: 4 INJECTION INTRA-ARTICULAR; INTRALESIONAL; INTRAMUSCULAR; INTRAVENOUS; SOFT TISSUE at 09:25

## 2025-02-27 RX ADMIN — ROCURONIUM BROMIDE 15 MG: 10 INJECTION INTRAVENOUS at 10:12

## 2025-02-27 ASSESSMENT — PAIN - FUNCTIONAL ASSESSMENT
PAIN_FUNCTIONAL_ASSESSMENT: 0-10
PAIN_FUNCTIONAL_ASSESSMENT: UNABLE TO SELF-REPORT
PAIN_FUNCTIONAL_ASSESSMENT: 0-10
PAIN_FUNCTIONAL_ASSESSMENT: UNABLE TO SELF-REPORT

## 2025-02-27 ASSESSMENT — PAIN SCALES - GENERAL
PAINLEVEL_OUTOF10: 6
PAINLEVEL_OUTOF10: 3
PAINLEVEL_OUTOF10: 3
PAINLEVEL_OUTOF10: 4
PAINLEVEL_OUTOF10: 0 - NO PAIN
PAINLEVEL_OUTOF10: 5 - MODERATE PAIN
PAINLEVEL_OUTOF10: 4
PAINLEVEL_OUTOF10: 2
PAINLEVEL_OUTOF10: 2

## 2025-02-27 NOTE — ANESTHESIA POSTPROCEDURE EVALUATION
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    Anesthesia Post Evaluation    Patient location during evaluation: PACU  Patient participation: complete - patient participated  Level of consciousness: sleepy but conscious  Pain management: adequate  Airway patency: patent  Cardiovascular status: acceptable and hemodynamically stable  Respiratory status: acceptable, spontaneous ventilation and face mask  Hydration status: acceptable  Postoperative Nausea and Vomiting: none        There were no known notable events for this encounter.    "

## 2025-02-27 NOTE — ANESTHESIA PREPROCEDURE EVALUATION
"Patient: Gen Govea \"Rich\"    Procedure Information       Date/Time: 02/27/25 0855    Procedure: MICROLARYNGOSCOPY, BRONCHOSCOPY, CO2 LASER, BIOPSY    Location: [facility] OR  / [facility] OR    Surgeons: Julia Tello MD            Relevant Problems   Cardiac   (+) Angina pectoris, unstable (Multi)   (+) Chest pain with normal coronary angiography   (+) Hyperlipidemia   (+) Hypertension   (+) Symptomatic sinus bradycardia   (+) Systolic murmur      Neuro   (+) Primary dysthymia      Endocrine   (+) Hypothyroidism       Chemistry    Lab Results   Component Value Date/Time     02/10/2025 1812    K 3.3 (L) 02/10/2025 1812    CL 99 02/10/2025 1812    CO2 28 02/10/2025 1812    BUN 13 02/10/2025 1812    CREATININE 0.80 02/10/2025 1812    Lab Results   Component Value Date/Time    CALCIUM 9.3 02/10/2025 1812    ALKPHOS 51 02/10/2025 1812    AST 19 02/10/2025 1812    ALT 20 02/10/2025 1812    BILITOT 1.8 (H) 02/10/2025 1812          Lab Results   Component Value Date/Time    WBC 8.5 02/10/2025 1812    HGB 14.1 02/10/2025 1812    HCT 42.4 02/10/2025 1812     02/10/2025 1812     Lab Results   Component Value Date/Time    PROTIME 12.3 02/10/2025 1814    INR 1.1 02/10/2025 1814     Encounter Date: 02/24/25   ECG 12 lead (Clinic Performed)    Narrative    Sinus bradycardia otherwise normal EKG      Stanford Wiggins DO,[facility]         Heart cath 2024  CONCLUSIONS:   1. The entire Left Main: <10% stenosis.   2. Mid LAD Lesion: The percent stenosis is 10-30%.   3. Mid CX Lesion: The percent stenosis is 10-30%.   4. Proximal, mid and distal RCA Lesion: The percent stenosis is 10-30%.   5. The Left Ventricular Ejection Fraction is 60%.  No results found for this or any previous visit from the past 1095 days.   Clinical information reviewed:    Allergies  Meds               NPO Detail:  No data recorded     Physical Exam    Airway  Mallampati: II  TM distance: >3 FB  Neck ROM: full     Cardiovascular    Dental " - normal exam     Pulmonary    Abdominal        Anesthesia Plan    History of general anesthesia?: yes  History of complications of general anesthesia?: no    ASA 3     general   (Low risk per the cardiologists )  intravenous induction   Postoperative administration of opioids is intended.  Trial extubation is planned.  Anesthetic plan and risks discussed with patient.

## 2025-02-27 NOTE — ANESTHESIA PROCEDURE NOTES
Airway  Date/Time: 2/27/2025 9:25 AM  Urgency: elective    Airway not difficult    Staffing  Performed: DANY   Authorized by: Alma Zelaya MD    Performed by: ADITYA Strong  Patient location during procedure: OR    Indications and Patient Condition  Indications for airway management: anesthesia  Spontaneous ventilation: present  Sedation level: deep  Preoxygenated: yes  Mask difficulty assessment: 1 - vent by mask  Planned trial extubation    Final Airway Details  Final airway type: endotracheal airway      Successful airway: ETT  Cuffed: yes   Successful intubation technique: video laryngoscopy  Facilitating devices/methods: intubating stylet  Endotracheal tube insertion site: oral  Blade type: Glide S4.  Blade size: #4  ETT size (mm): 5.0  Cormack-Lehane Classification: grade I - full view of glottis  Placement verified by: chest auscultation and capnometry   Number of attempts at approach: 1

## 2025-02-27 NOTE — OP NOTE
"ENT Operative Note     Date: 2025  OR Location: [facility] OR    Name: Gen Govea \"Haroldo\", : 1955, Age: 69 y.o., MRN: [id number], Sex: male    Diagnosis  Pre-op Diagnosis      * Laryngeal mass [J38.7]     * Dysphonia [R49.0] Post-op Diagnosis     * Laryngeal mass [J38.7]     * Dysphonia [R49.0]     Procedures  MICROLARYNGOSCOPY, BRONCHOSCOPY, CO2 LASER, BIOPSY  Microdirect laryngoscopy, CO2 laser-assisted microflap excision of right and left vocal fold lesions [53741]  Supraglottoplasty  Diagnostic bronchoscopy [20915]  Steroid injection [33766]      Surgeons      * Julia Tello - Primary    Resident/Fellow/Other Assistant:  Surgeons and Role:  * No surgeons found with a matching role *    Staff:   Ty: Eusebia Vossulator: Aurelia Riggs Person: Eri    Anesthesia Staff: Anesthesiologist: Alma Zelaya MD  C-AA: ADITYA Strong  Medical Student: Primo Copeland    Procedure Summary  Anesthesia: General  ASA: III  Estimated Blood Loss: 3mL  Intra-op Medications:   Administrations occurring from 0855 to 1125 on 25:   Medication Name Total Dose   dexAMETHasone (Decadron) injection 5 mg   sodium chloride 0.9 % irrigation solution 1,000 mL   EPINEPHrine HCl (PF) (Adrenalin) injection 4 mg   fluorescein 1 mg ophthalmic strip 1 strip   dexAMETHasone (Decadron) injection 4 mg/mL 10 mg   fentaNYL (Sublimaze) injection 50 mcg/mL 100 mcg   lactated Ringer's infusion Cannot be calculated   lidocaine (cardiac) injection 2% prefilled syringe 100 mg   lidocaine (LTA Kit) for intubation 4 mL   midazolam PF (Versed) injection 1 mg/mL 2 mg   ondansetron (Zofran) 2 mg/mL injection 4 mg   oxygen (O2) therapy 6 L   propofol (Diprivan) injection 10 mg/mL 456.78 mg   rocuronium (ZeMuron) 50 mg/5 mL injection 80 mg   sugammadex (Bridion) 200 mg/2 mL injection 200 mg              Anesthesia Record               Intraprocedure I/O Totals          Intake    lactated Ringer's 800.00 mL    Total Intake 800 mL       " "Output    Est. Blood Loss 2 mL    Total Output 2 mL       Net    Net Volume 798 mL          Specimen:   ID Type Source Tests Collected by Time   1 : right false vocal cord Tissue SOFT TISSUE RESECTION SURGICAL PATHOLOGY EXAM Julia Tello MD 2/27/2025 1000   2 : right true vocal cord lesion Tissue SOFT TISSUE RESECTION SURGICAL PATHOLOGY EXAM Julia Tello MD 2/27/2025 1021   3 : additional right true vocal cord inferior Tissue SOFT TISSUE RESECTION SURGICAL PATHOLOGY EXAM Julia Tello MD 2/27/2025 1027   4 : right infraglottis Tissue SOFT TISSUE RESECTION SURGICAL PATHOLOGY EXAM Julia Tello MD 2/27/2025 1031   5 : right ventricle Tissue SOFT TISSUE RESECTION SURGICAL PATHOLOGY EXAM Julia Tello MD 2/27/2025 1036   6 : left false vocal cord Tissue SOFT TISSUE RESECTION SURGICAL PATHOLOGY EXAM Julia Tello MD 2/27/2025 1046   7 : left true vocal cord Tissue SOFT TISSUE RESECTION SURGICAL PATHOLOGY EXAM Julia Tello MD 2/27/2025 1053           Drains and/or Catheters: * None in log *    Tourniquet Times:         Implants:     Findings:   Grade 1 exposure with Dedo, 1 click HOB, anterior pressure & tape  Bilateral supraglottoplasty performed for exposure  Exophytic papillomatous right true vocal fold lesion s/p microflap excision, additional tissue taken within the right superior surface/ventricle and in the infraglottis  Irregular appearing epithelium on left true vocal fold, separate from right s/p microflap excisional biopsy  Bronchoscopy performed with subglottis and distal airways patent down to the bilateral mainstems    Indications: Gen Govea \"Haroldo\" is an 69 y.o. male who is having surgery for Laryngeal mass [J38.7].     The patient was seen in the preoperative area. The risks, benefits, complications, treatment options, non-operative alternatives, expected recovery and outcomes were discussed with the patient. The possibilities of reaction to medication, pulmonary aspiration, injury to surrounding " structures, bleeding, recurrent infection, the need for additional procedures, failure to diagnose a condition, and creating a complication requiring transfusion or operation were discussed with the patient. The patient concurred with the proposed plan, giving informed consent.  The site of surgery was properly noted/marked if necessary per policy. The patient has been actively warmed in preoperative area. Preoperative antibiotics are not indicated. Venous thrombosis prophylaxis have been ordered including bilateral sequential compression devices    Procedure Details:   The patient was taken to the operative suite and transferred to the operating table and laid supine. A pre-operative timeout was performed with all participating parties. General anesthesia was induced, and the patient was intubated with a 5-0 laser-safe Tenax tube. A final timeout was completed and we began with our procedure.     A dental guard was used to protect the patient's teeth. The eyes, face and neck were protected appropriately from laser instrumentation. A Dedo laryngoscope was used to expose the bilateral vocal folds and false folds and the patient was suspended to allow visualization of the larynx with the findings noted above. The telescope was then advanced past the glottis to visualize the trachea to rule out any further lesions or airway narrowing with the findings noted above. Photos were taken with 0 and 30 degree telescopes. A wet cottonoid was placed subglottically to protect the laser safe tube.      The operating microscope was brought into the field. 1mL of decadron 10mg/mL was injected into the subepithelial space of the bilateral vocal folds.  A CO2 laser on a line setting of 10 colmenares was then used to perform a bilateral supraglottoplasty, excising the false vocal fold tissue for improved exposure of the bilateral true vocal folds. We then turned to the right vocal fold lesion, which was excised using a combination of CO2  and cold instruments. The lesion lifted easily off the underlying vocal ligament. The lesion was sent for pathology in 2 parts. Additional tissue from the right ventricle and infraglottis was sent for permanent pathology.     Next we turned out attention to the left vocal fold. There was a small region of irregular tissue on the left superomedial surface; this was excised using a microflap technique with a combination of CO2 laser and cold instruments. A defocused [place] beam was used to ablate any abnormal tissue on both sides and any feeding vessels. Hemostasis was achieved with epinephrine-soaked cottonoids and laser cautery as needed.     The subglottic cottonoid was removed, laryngotracheal anesthesia was applied, and the patient was taken out of suspension. The patient was turned back to our anesthesia colleagues for an uncomplicated wakeup.     Complications:  None; patient tolerated the procedure well.    Disposition: PACU - hemodynamically stable.  Condition: stable     Additional Details:     PRE/POST      Attending Attestation: I performed the procedure.    Julia Tello MD, MAEd    [employer]  [facility]  Department of Otolaryngology - Head and Neck Surgery  [facility]

## 2025-03-05 LAB
LABORATORY COMMENT REPORT: NORMAL
PATH REPORT.FINAL DX SPEC: NORMAL
PATH REPORT.GROSS SPEC: NORMAL
PATH REPORT.RELEVANT HX SPEC: NORMAL
PATH REPORT.TOTAL CANCER: NORMAL

## 2025-03-13 ENCOUNTER — APPOINTMENT (OUTPATIENT)
Dept: CARDIOLOGY | Facility: CLINIC | Age: 70
End: 2025-03-13
Payer: MEDICARE

## 2025-03-25 ENCOUNTER — OFFICE VISIT (OUTPATIENT)
Dept: OTOLARYNGOLOGY | Facility: CLINIC | Age: 70
End: 2025-03-25
Payer: MEDICARE

## 2025-03-25 VITALS — HEIGHT: 72 IN | WEIGHT: 199.8 LBS | TEMPERATURE: 97.5 F | BODY MASS INDEX: 27.06 KG/M2

## 2025-03-25 DIAGNOSIS — R49.0 DYSPHONIA: Primary | ICD-10-CM

## 2025-03-25 DIAGNOSIS — C32.9 MALIGNANT NEOPLASM OF LARYNX: ICD-10-CM

## 2025-03-25 DIAGNOSIS — K21.9 LPRD (LARYNGOPHARYNGEAL REFLUX DISEASE): ICD-10-CM

## 2025-03-25 DIAGNOSIS — J38.7 LARYNGEAL MASS: ICD-10-CM

## 2025-03-25 PROCEDURE — 1159F MED LIST DOCD IN RCRD: CPT | Performed by: OTOLARYNGOLOGY

## 2025-03-25 PROCEDURE — 1125F AMNT PAIN NOTED PAIN PRSNT: CPT | Performed by: OTOLARYNGOLOGY

## 2025-03-25 PROCEDURE — 31579 LARYNGOSCOPY TELESCOPIC: CPT | Performed by: OTOLARYNGOLOGY

## 2025-03-25 PROCEDURE — 99215 OFFICE O/P EST HI 40 MIN: CPT | Mod: 25 | Performed by: OTOLARYNGOLOGY

## 2025-03-25 PROCEDURE — 1160F RVW MEDS BY RX/DR IN RCRD: CPT | Performed by: OTOLARYNGOLOGY

## 2025-03-25 PROCEDURE — 3008F BODY MASS INDEX DOCD: CPT | Performed by: OTOLARYNGOLOGY

## 2025-03-25 PROCEDURE — 99215 OFFICE O/P EST HI 40 MIN: CPT | Performed by: OTOLARYNGOLOGY

## 2025-03-25 RX ORDER — PANTOPRAZOLE SODIUM 40 MG/1
40 TABLET, DELAYED RELEASE ORAL
Qty: 30 TABLET | Refills: 0 | Status: SHIPPED | OUTPATIENT
Start: 2025-03-25 | End: 2025-04-24

## 2025-03-25 RX ORDER — METHYLPREDNISOLONE 4 MG/1
TABLET ORAL
Qty: 21 TABLET | Refills: 0 | Status: SHIPPED | OUTPATIENT
Start: 2025-03-25 | End: 2025-03-31

## 2025-03-25 ASSESSMENT — PAIN SCALES - GENERAL: PAINLEVEL_OUTOF10: 3

## 2025-03-25 ASSESSMENT — PATIENT HEALTH QUESTIONNAIRE - PHQ9
10. IF YOU CHECKED OFF ANY PROBLEMS, HOW DIFFICULT HAVE THESE PROBLEMS MADE IT FOR YOU TO DO YOUR WORK, TAKE CARE OF THINGS AT HOME, OR GET ALONG WITH OTHER PEOPLE: NOT DIFFICULT AT ALL
SUM OF ALL RESPONSES TO PHQ9 QUESTIONS 1 AND 2: 2
2. FEELING DOWN, DEPRESSED OR HOPELESS: SEVERAL DAYS
1. LITTLE INTEREST OR PLEASURE IN DOING THINGS: SEVERAL DAYS

## 2025-03-25 NOTE — PROGRESS NOTES
"Patient: Gen Govea   MRN: [id number] YOB: 1955   Sex: male Age: 69 y.o.  Date of Service: 3/25/2025       ASSESSMENT AND PLAN  I discussed the findings with Gen Gvoea \"Haroldo\" and have recommended the followin. 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 s/p MDL with CO2 laser supraglottoplasty and excision of bilateral VF lesions 25. Healing as expected. We discussed the need for close monitoring an possible office laser as neeed  - Follow-up 4-6 weeks for repeat strobe.      CHIEF COMPLAINT  Dysphonia    HISTORY OF PRESENT ILLNESS  Gen Govea \"Edwin" is a 69 y.o. male referred by Dr. Magaña ref. provider found for evaluation of dysphonia.     3/25/25  He is s/p MDL with CO2 laser supraglottoplasty and excision of bilateral VF lesions 25. 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 He reports overall doing well.. His voice has been slow to come back.     25  The patient reports voice issues since last summer, worse in the morning. Some irritation if he talks a lot. 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 Denies weight loss, no hemoptysis.    PMH: HTN, HLD  Meds: he is on a blood thinner although it is not listed in his med list, he is not sure the name  SH: quit smoking 35-40 years ago, previously 1ppd, used to drink 2-3 drinks a week but has since quit because it bothered his throat    ADDITIONAL HISTORY  Past Medical History  He has a past medical history of Coronary artery disease, Disease of thyroid gland, Hyperlipidemia, and Hypertension. Surgical History  He has a past surgical history that includes Colonoscopy and Cardiac catheterization (N/A, 2024).   Social History  He reports that he quit smoking about 40 years ago. His smoking use included cigarettes. He has never used smokeless tobacco. He reports current alcohol use of about 2.0 - 3.0 standard drinks of alcohol per week. He reports that he does not currently use drugs. " Allergies  Patient has no known allergies.     Family History  Family History   Problem Relation Name Age of Onset    Alcohol abuse Mother      Cirrhosis Mother      Pancreatitis Father          REVIEW OF SYSTEMS  All 10 systems were reviewed and negative except for above.      PHYSICAL EXAM  ENT Physical Exam   GENERAL: Well-nourished and developed, alert and appropriate, no distress, voice [id number]  RESPIRATORY: Breathing quietly, no stridor  HEAD: Normocephalic atraumatic  FACE: Symmetric, no masses or lesions  EYES:  Pupils reactive, sclera clear, external ocular muscles intact, no nystagmus.    EARS:  Pinnae normal.   NOSE:  No anterior lesions, masses or polyps.  ORAL CAVITY/OROPHARYNX:  Buccal mucosa is moist without lesions or masses, tongue midline and palate elevates symmetrically. Tongue mobility intact.  NECK:  Soft. There is no abnormal lymphadenopathy or thyromegaly.    NEUROLOGIC:  Cranial nerves II-XII grossly intact.       Last Recorded Vitals  There were no vitals taken for this visit.    RESULTS    Patient Reported Outcome Measures  N/A    Laboratory, Radiology, and Pathology  I personally reviewed the following results, with the following interpretation:   Path 3/25/25  FINAL DIAGNOSIS   A.  False vocal cord, right, excision:  - Respiratory mucosa with squamous metaplasia and underlying seromucinous glands, negative for high-grade dysplasia and carcinoma.     B.  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     C.  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     D.  Larynx, right infraglottis, excision:  - High-grade dysplasia, negative for invasive carcinoma.     E.  Ventricle, right, excision:  - Denuded epithelium with underlying fibrovascular tissue, and seromucinous glands, see note.  - Detached fragment of dysplastic epithelium, see  note.     Note: The denuded epithelium is thin, with little epithelium available for evaluation.  Dysplasia cannot be entirely ruled out.  The small detached fragment of dysplastic epithelium does not show connection to the connective tissue with multiple deeper levels examined.  Clinical correlation is recommended.     F.  False vocal cord, left, excision:  - Respiratory mucosa with squamous metaplasia and underlying seromucinous glands, negative for high-grade dysplasia and carcinoma.     G.  True vocal cord, left, excision:  - Squamous mucosa with high-grade dysplasia with focal areas concerning for superficial invasion, see note.     Note: Multiple deeper levels were examined.   There are focal areas concerning for but not diagnostic of definitive invasion.  Clinical correlation is recommended.       CT neck 2/5/25  IMPRESSION:  Mild soft tissue thickening and irregularity of bilateral true vocal  cords, likely corresponding to lesion seen on laryngoscopic exam. No  extension into the supraglottic or subglottic larynx. No cartilage  invasion or extra laryngeal extension.      No cervical lymphadenopathy.      Posterior osteophytes versus ossification of posterior longitudinal  ligament at C2-3 C3-4 resulting in at least mild-to-moderate  narrowing of the spinal canal.    Cardiac catheterization May 2024 - normal coronaries with normal LV function.        PROCEDURES  Flexible Fiberoptic Laryngoscopy with Stroboscopy    Patient failed a mirror exam due to limitations of equipment and the need for stroboscopy to assess glottic vibration and closure.     PREOPERATIVE DIAGNOSIS: Dysphonia    POSTOPERATIVE DIAGNOSIS: Same    PROCEDURE:  Strobovideolaryngoscopy    ANESTHESIA:  Topical    COMPLICATIONS:  None    SPECIMENS:  None    PROCEDURE IN DETAIL: The patient was seated in an upright position.  The nasal cavity was topically decongested and anesthetized.  The stroboscopic microphone was held to the neck at the  level of the larynx.  The distal chip video laryngoscope was passed through the nasal cavity.  The nasal cavity and nasopharynx were within normal limits except noted below.  The following findings on stroboscopy were noted:      Tongue Base: no masses or lesions   Vocal Fold Mobility               Right VF: mobile               Left VF: mobile   TVF Appearance               Edema/Erythema: mild edema and erythema               Lesions/vibratory margin irregularities: thickened epithelium bilaterally   Glottic Closure Pattern: complete    Vibration:               Phase: asymmetric   Periodicity: irregular               Amplitude: absent               Waveform: absent    Muscle Tension Patterns:  lateral   Other Findings: healing granulation along bilateral false folds    The patient tolerated the procedure well.     3/24/25      Original lesion        ----------------------------------------------------------------------  Julia Tello MD, MAEd    [facility]  Department of Otolaryngology - Head and Neck Surgery  [facility]    The total time I spent in care of this patient today (excluding time spent on other billable services) is as follows:    Time Spent  Prep time on day of patient encounter: 10 minutes  Time spent directly with patient, family or caregiver: 15 minutes  Additional Time Spent on Patient Care Activities: 5 minutes  Documentation Time: 10 minutes  Other Time Spent: 0 minutes  Total: 40 minutes

## 2025-04-14 ENCOUNTER — HOSPITAL ENCOUNTER (OUTPATIENT)
Dept: RADIOLOGY | Facility: CLINIC | Age: 70
Discharge: HOME | End: 2025-04-14
Payer: MEDICARE

## 2025-04-14 ENCOUNTER — APPOINTMENT (OUTPATIENT)
Dept: PRIMARY CARE | Facility: CLINIC | Age: 70
End: 2025-04-14
Payer: MEDICARE

## 2025-04-14 VITALS
DIASTOLIC BLOOD PRESSURE: 88 MMHG | SYSTOLIC BLOOD PRESSURE: 150 MMHG | HEART RATE: 63 BPM | HEIGHT: 72 IN | BODY MASS INDEX: 26.28 KG/M2 | WEIGHT: 194 LBS | OXYGEN SATURATION: 95 % | RESPIRATION RATE: 14 BRPM

## 2025-04-14 DIAGNOSIS — R73.9 HYPERGLYCEMIA: ICD-10-CM

## 2025-04-14 DIAGNOSIS — R39.14 BENIGN PROSTATIC HYPERPLASIA WITH INCOMPLETE BLADDER EMPTYING: ICD-10-CM

## 2025-04-14 DIAGNOSIS — E55.9 MILD VITAMIN D DEFICIENCY: ICD-10-CM

## 2025-04-14 DIAGNOSIS — R53.83 OTHER FATIGUE: ICD-10-CM

## 2025-04-14 DIAGNOSIS — I10 PRIMARY HYPERTENSION: ICD-10-CM

## 2025-04-14 DIAGNOSIS — N40.1 BENIGN PROSTATIC HYPERPLASIA WITH INCOMPLETE BLADDER EMPTYING: ICD-10-CM

## 2025-04-14 DIAGNOSIS — Z87.891 FORMER SMOKER: ICD-10-CM

## 2025-04-14 DIAGNOSIS — F41.9 ANXIETY: ICD-10-CM

## 2025-04-14 DIAGNOSIS — E78.5 DYSLIPIDEMIA: ICD-10-CM

## 2025-04-14 DIAGNOSIS — Z00.00 PREVENTATIVE HEALTH CARE: ICD-10-CM

## 2025-04-14 DIAGNOSIS — M54.16 RIGHT LUMBAR RADICULOPATHY: ICD-10-CM

## 2025-04-14 DIAGNOSIS — Z00.00 MEDICARE ANNUAL WELLNESS VISIT, SUBSEQUENT: Primary | ICD-10-CM

## 2025-04-14 PROCEDURE — 72110 X-RAY EXAM L-2 SPINE 4/>VWS: CPT | Performed by: RADIOLOGY

## 2025-04-14 PROCEDURE — G0439 PPPS, SUBSEQ VISIT: HCPCS | Performed by: INTERNAL MEDICINE

## 2025-04-14 PROCEDURE — 3077F SYST BP >= 140 MM HG: CPT | Performed by: INTERNAL MEDICINE

## 2025-04-14 PROCEDURE — 99213 OFFICE O/P EST LOW 20 MIN: CPT | Performed by: INTERNAL MEDICINE

## 2025-04-14 PROCEDURE — 3079F DIAST BP 80-89 MM HG: CPT | Performed by: INTERNAL MEDICINE

## 2025-04-14 PROCEDURE — 3008F BODY MASS INDEX DOCD: CPT | Performed by: INTERNAL MEDICINE

## 2025-04-14 PROCEDURE — 72110 X-RAY EXAM L-2 SPINE 4/>VWS: CPT

## 2025-04-14 PROCEDURE — 1159F MED LIST DOCD IN RCRD: CPT | Performed by: INTERNAL MEDICINE

## 2025-04-14 RX ORDER — BUSPIRONE HYDROCHLORIDE 15 MG/1
15 TABLET ORAL 2 TIMES DAILY PRN
Qty: 60 TABLET | Refills: 11 | Status: SHIPPED | OUTPATIENT
Start: 2025-04-14 | End: 2026-04-14

## 2025-04-14 RX ORDER — FINASTERIDE 5 MG/1
5 TABLET, FILM COATED ORAL DAILY
Qty: 30 TABLET | Refills: 11 | Status: SHIPPED | OUTPATIENT
Start: 2025-04-14 | End: 2026-04-14

## 2025-04-14 ASSESSMENT — ENCOUNTER SYMPTOMS
MYALGIAS: 0
NAUSEA: 0
COUGH: 0
DIARRHEA: 0
SHORTNESS OF BREATH: 0
CONSTIPATION: 0
VOMITING: 0
CHILLS: 0
FEVER: 0
DIAPHORESIS: 0
VOICE CHANGE: 1
JOINT SWELLING: 0

## 2025-04-14 ASSESSMENT — PATIENT HEALTH QUESTIONNAIRE - PHQ9
1. LITTLE INTEREST OR PLEASURE IN DOING THINGS: SEVERAL DAYS
SUM OF ALL RESPONSES TO PHQ9 QUESTIONS 1 AND 2: 2
2. FEELING DOWN, DEPRESSED OR HOPELESS: SEVERAL DAYS

## 2025-04-14 ASSESSMENT — COLUMBIA-SUICIDE SEVERITY RATING SCALE - C-SSRS
6. HAVE YOU EVER DONE ANYTHING, STARTED TO DO ANYTHING, OR PREPARED TO DO ANYTHING TO END YOUR LIFE?: NO
2. HAVE YOU ACTUALLY HAD ANY THOUGHTS OF KILLING YOURSELF?: NO
1. IN THE PAST MONTH, HAVE YOU WISHED YOU WERE DEAD OR WISHED YOU COULD GO TO SLEEP AND NOT WAKE UP?: NO

## 2025-04-14 NOTE — PATIENT INSTRUCTIONS
FASTING LABS ARE ORDERED FOR YOU    2.  FINASTERIDE 5 MG DAILY TAKE IN ADDITION TO THE FLOMAX TO LOOSEN UP THE PROSTATE SO YOU CAN URINATE AND SLEEP BETTER.  IF THIS ISN'T HELPING IN 3 MONTHS, THEN WILL GET YOU TO UROLOGY    3.  BUSPIRONE 15 MG TWICE DAILY AS NEEDED FOR ANXIETY.  IF THIS DOESN'T HELP AND ANXIETY STILL TROUBLESOME, THEN WILL CONSIDER A STRONGER AS NEEDED MEDICATION SUCH AS ATIVAN (VALIUM - LIKE SUBSTANCE ) TO USE AS NEEDED    4.  COLONOSCOPY IS ORDERED ALREADY    5.  ULTRASOUND AORTA IS ORDERED PER MEDICARE TO EXCLUDE ANEURYSM SINCE SMOKING HISTORY    6.  PLEASE CALL IF REFILLS ARE NEEDED    7.  REGULAR EYE EXAM IS RECOMMENDED DUE TO BLOOD PRESSURE.    8.  XRAY LUMBAR SPINE TO SEE WHAT DEGREE OF ARTHRITIS IS THERE GIVEN SUGGESTION OF PINCHED NERVE ON RIGHT    9.  SHINGRIX AND TETANUS IMMUNIZATIONS ARE RECOMMENDED AT THE LOCAL PHARMACY WHEN YOU ARE ABLE, SCRIPT IS PRINTED OUT FOR THE SHINGRIX IMMUNIZATIONS    10.  FOLLOW UP 3 MONTHS OR AS NEEDED.

## 2025-04-14 NOTE — PROGRESS NOTES
"Subjective   Gen DIAL Rice \"Haroldo\" is a 69 y.o. male who presents for  FOLLOW UP   HAD A NODULE REMOVED ON VOICE BOX 2/27 CANCEROUS  ALL REMOVED NO TX NEEDED HAS SOFT VOICE   PT HAS BEEN ANXIOUS FROM LOSS OF VOICE FATIGUED AND DIZZY           HPI   Had biopsy on vocal cord.  NOT GETTING VOICE BACK FOR MANY MONTHS    HAVING A LOT OF ANXIETY BECAUSE OF THE VOCAL CORD CANCER DX    GET LOW BACK PAIN, OCCASIONALLY GET BOUND UP WHEN EATING, OTHER TIMES FATIGUE AND TIRED.  ALSO CAN'T SLEEP.    TOOK MEDICATION IN THE PAST FOR ANXIETY    FLOMAX IS ALSO NOT HELPING AS MUCH AS IT USED TO  Review of Systems   Constitutional:  Negative for chills, diaphoresis and fever.   HENT:  Positive for voice change.    Respiratory:  Negative for cough and shortness of breath.    Cardiovascular:  Negative for leg swelling.   Gastrointestinal:  Negative for constipation, diarrhea, nausea and vomiting.   Musculoskeletal:  Negative for joint swelling and myalgias.       Objective   /88   Pulse 63   Resp 14   Ht 1.829 m (6')   Wt 88 kg (194 lb)   SpO2 95%   BMI 26.31 kg/m²     Physical Exam  Vitals reviewed.   Constitutional:       General: He is not in acute distress.     Appearance: He is not ill-appearing.   HENT:      Mouth/Throat:      Comments: WHISPERING/DYSPHONIA  Neck:      Vascular: No carotid bruit.   Cardiovascular:      Rate and Rhythm: Normal rate and regular rhythm.      Pulses: Normal pulses.      Heart sounds:      No gallop.   Pulmonary:      Breath sounds: Normal breath sounds. No wheezing, rhonchi or rales.   Abdominal:      General: Abdomen is flat. Bowel sounds are normal.      Palpations: Abdomen is soft.      Tenderness: There is no guarding or rebound.   Musculoskeletal:      Right lower leg: No edema.      Left lower leg: No edema.   Lymphadenopathy:      Cervical: No cervical adenopathy.   Neurological:      General: No focal deficit present.      Mental Status: He is oriented to person, place, and time. "   Psychiatric:         Mood and Affect: Mood normal.         Behavior: Behavior normal.         Thought Content: Thought content normal.         Assessment/Plan   Problem List Items Addressed This Visit       Fatigue    Relevant Orders    Vitamin B12    Hypertension    Relevant Orders    Albumin-Creatinine Ratio, Urine Random    Medicare annual wellness visit, subsequent - Primary    Former smoker    Relevant Orders    Vascular US abdominal aorta anuerysm AAA screening     Other Visit Diagnoses       Benign prostatic hyperplasia with incomplete bladder emptying        Relevant Medications    finasteride (Proscar) 5 mg tablet    Other Relevant Orders    Prostate Specific Antigen, Screen    Anxiety        Relevant Medications    busPIRone (Buspar) 15 mg tablet    Dyslipidemia        Relevant Orders    Lipid Panel    Comprehensive Metabolic Panel    CBC    Mild vitamin D deficiency        Relevant Orders    Vitamin D 25-Hydroxy,Total (for eval of Vitamin D levels)    Preventative health care        Relevant Medications    zoster vaccine-recombinant adjuvanted (Shingrix) 50 mcg/0.5 mL vaccine    Other Relevant Orders    Hepatitis C Antibody    Vascular US abdominal aorta anuerysm AAA screening    Hyperglycemia        Relevant Orders    Hemoglobin A1C    Right lumbar radiculopathy              Patient Instructions    FASTING LABS ARE ORDERED FOR YOU    2.  FINASTERIDE 5 MG DAILY TAKE IN ADDITION TO THE FLOMAX TO LOOSEN UP THE PROSTATE SO YOU CAN URINATE AND SLEEP BETTER.  IF THIS ISN'T HELPING IN 3 MONTHS, THEN WILL GET YOU TO UROLOGY    3.  BUSPIRONE 15 MG TWICE DAILY AS NEEDED FOR ANXIETY.  IF THIS DOESN'T HELP AND ANXIETY STILL TROUBLESOME, THEN WILL CONSIDER A STRONGER AS NEEDED MEDICATION SUCH AS ATIVAN (VALIUM - LIKE SUBSTANCE ) TO USE AS NEEDED    4.  COLONOSCOPY IS ORDERED ALREADY    5.  ULTRASOUND AORTA IS ORDERED PER MEDICARE TO EXCLUDE ANEURYSM SINCE SMOKING HISTORY    6.  PLEASE CALL IF REFILLS ARE  NEEDED    7.  REGULAR EYE EXAM IS RECOMMENDED DUE TO BLOOD PRESSURE.    8.  XRAY LUMBAR SPINE TO SEE WHAT DEGREE OF ARTHRITIS IS THERE GIVEN SUGGESTION OF PINCHED NERVE ON RIGHT    9.  SHINGRIX AND TETANUS IMMUNIZATIONS ARE RECOMMENDED AT THE LOCAL PHARMACY WHEN YOU ARE ABLE, SCRIPT IS PRINTED OUT FOR THE SHINGRIX IMMUNIZATIONS    10.  FOLLOW UP 3 MONTHS OR AS NEEDED.

## 2025-04-16 DIAGNOSIS — K21.9 LPRD (LARYNGOPHARYNGEAL REFLUX DISEASE): ICD-10-CM

## 2025-04-17 RX ORDER — PANTOPRAZOLE SODIUM 40 MG/1
40 TABLET, DELAYED RELEASE ORAL
Qty: 90 TABLET | Refills: 1 | Status: SHIPPED | OUTPATIENT
Start: 2025-04-17 | End: 2025-05-17

## 2025-04-18 ENCOUNTER — HOSPITAL ENCOUNTER (OUTPATIENT)
Dept: RADIOLOGY | Facility: CLINIC | Age: 70
Discharge: HOME | End: 2025-04-18
Payer: MEDICARE

## 2025-04-18 DIAGNOSIS — Z00.00 PREVENTATIVE HEALTH CARE: ICD-10-CM

## 2025-04-18 DIAGNOSIS — Z87.891 FORMER SMOKER: ICD-10-CM

## 2025-04-18 PROCEDURE — 76706 US ABDL AORTA SCREEN AAA: CPT

## 2025-04-19 LAB
25(OH)D3+25(OH)D2 SERPL-MCNC: 40 NG/ML (ref 30–100)
ALBUMIN SERPL-MCNC: 4.7 G/DL (ref 3.6–5.1)
ALBUMIN/CREAT UR: 40 MG/G CREAT
ALP SERPL-CCNC: 56 U/L (ref 35–144)
ALT SERPL-CCNC: 18 U/L (ref 9–46)
ANION GAP SERPL CALCULATED.4IONS-SCNC: 8 MMOL/L (CALC) (ref 7–17)
AST SERPL-CCNC: 16 U/L (ref 10–35)
BILIRUB SERPL-MCNC: 1.3 MG/DL (ref 0.2–1.2)
BUN SERPL-MCNC: 22 MG/DL (ref 7–25)
CALCIUM SERPL-MCNC: 9.4 MG/DL (ref 8.6–10.3)
CHLORIDE SERPL-SCNC: 104 MMOL/L (ref 98–110)
CHOLEST SERPL-MCNC: 174 MG/DL
CHOLEST/HDLC SERPL: 3.6 (CALC)
CO2 SERPL-SCNC: 32 MMOL/L (ref 20–32)
CREAT SERPL-MCNC: 0.73 MG/DL (ref 0.7–1.35)
CREAT UR-MCNC: 217 MG/DL (ref 20–320)
EGFRCR SERPLBLD CKD-EPI 2021: 99 ML/MIN/1.73M2
ERYTHROCYTE [DISTWIDTH] IN BLOOD BY AUTOMATED COUNT: 13 % (ref 11–15)
EST. AVERAGE GLUCOSE BLD GHB EST-MCNC: 117 MG/DL
EST. AVERAGE GLUCOSE BLD GHB EST-SCNC: 6.5 MMOL/L
GLUCOSE SERPL-MCNC: 102 MG/DL (ref 65–99)
HBA1C MFR BLD: 5.7 %
HCT VFR BLD AUTO: 44.8 % (ref 38.5–50)
HCV AB SERPL QL IA: NORMAL
HDLC SERPL-MCNC: 49 MG/DL
HGB BLD-MCNC: 14.6 G/DL (ref 13.2–17.1)
LDLC SERPL CALC-MCNC: 98 MG/DL (CALC)
MCH RBC QN AUTO: 29 PG (ref 27–33)
MCHC RBC AUTO-ENTMCNC: 32.6 G/DL (ref 32–36)
MCV RBC AUTO: 89.1 FL (ref 80–100)
MICROALBUMIN UR-MCNC: 8.6 MG/DL
NONHDLC SERPL-MCNC: 125 MG/DL (CALC)
PLATELET # BLD AUTO: 297 THOUSAND/UL (ref 140–400)
PMV BLD REES-ECKER: 8.8 FL (ref 7.5–12.5)
POTASSIUM SERPL-SCNC: 4 MMOL/L (ref 3.5–5.3)
PROT SERPL-MCNC: 7.3 G/DL (ref 6.1–8.1)
PSA SERPL-MCNC: 0.23 NG/ML
RBC # BLD AUTO: 5.03 MILLION/UL (ref 4.2–5.8)
SODIUM SERPL-SCNC: 144 MMOL/L (ref 135–146)
TRIGL SERPL-MCNC: 175 MG/DL
VIT B12 SERPL-MCNC: 864 PG/ML (ref 200–1100)
WBC # BLD AUTO: 4.7 THOUSAND/UL (ref 3.8–10.8)

## 2025-04-22 ENCOUNTER — OFFICE VISIT (OUTPATIENT)
Dept: OTOLARYNGOLOGY | Facility: CLINIC | Age: 70
End: 2025-04-22
Payer: MEDICARE

## 2025-04-22 VITALS — TEMPERATURE: 97.9 F | WEIGHT: 194.4 LBS | HEIGHT: 71 IN | BODY MASS INDEX: 27.22 KG/M2

## 2025-04-22 DIAGNOSIS — R49.0 DYSPHONIA: ICD-10-CM

## 2025-04-22 DIAGNOSIS — C32.9 MALIGNANT NEOPLASM OF LARYNX: Primary | ICD-10-CM

## 2025-04-22 DIAGNOSIS — Z12.11 COLON CANCER SCREENING: Primary | ICD-10-CM

## 2025-04-22 PROCEDURE — 99214 OFFICE O/P EST MOD 30 MIN: CPT | Mod: 25 | Performed by: OTOLARYNGOLOGY

## 2025-04-22 PROCEDURE — 31579 LARYNGOSCOPY TELESCOPIC: CPT | Performed by: OTOLARYNGOLOGY

## 2025-04-22 PROCEDURE — 1159F MED LIST DOCD IN RCRD: CPT | Performed by: OTOLARYNGOLOGY

## 2025-04-22 PROCEDURE — 1125F AMNT PAIN NOTED PAIN PRSNT: CPT | Performed by: OTOLARYNGOLOGY

## 2025-04-22 PROCEDURE — 99214 OFFICE O/P EST MOD 30 MIN: CPT | Performed by: OTOLARYNGOLOGY

## 2025-04-22 PROCEDURE — 1160F RVW MEDS BY RX/DR IN RCRD: CPT | Performed by: OTOLARYNGOLOGY

## 2025-04-22 PROCEDURE — 3008F BODY MASS INDEX DOCD: CPT | Performed by: OTOLARYNGOLOGY

## 2025-04-22 PROCEDURE — 1036F TOBACCO NON-USER: CPT | Performed by: OTOLARYNGOLOGY

## 2025-04-22 RX ORDER — SODIUM, POTASSIUM,MAG SULFATES 17.5-3.13G
SOLUTION, RECONSTITUTED, ORAL ORAL
Qty: 354 ML | Refills: 0 | Status: SHIPPED | OUTPATIENT
Start: 2025-04-22

## 2025-04-22 ASSESSMENT — PATIENT HEALTH QUESTIONNAIRE - PHQ9
2. FEELING DOWN, DEPRESSED OR HOPELESS: NOT AT ALL
1. LITTLE INTEREST OR PLEASURE IN DOING THINGS: NOT AT ALL
SUM OF ALL RESPONSES TO PHQ9 QUESTIONS 1 AND 2: 0

## 2025-04-22 ASSESSMENT — PAIN SCALES - GENERAL: PAINLEVEL_OUTOF10: 3

## 2025-04-22 NOTE — PROGRESS NOTES
"Patient: Gen Govea   MRN: [id number] YOB: 1955   Sex: male Age: 69 y.o.  Date of Service: 2025       ASSESSMENT AND PLAN  I discussed the findings with Gen Govea \"Haroldo\" and have recommended the followin. 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 s/p MDL with CO2 laser supraglottoplasty and excision of bilateral VF lesions 25. Healing as expected. We discussed the need for close monitoring an possible office laser as needed  - Follow-up 4-6 weeks for repeat strobe.      CHIEF COMPLAINT  Dysphonia    HISTORY OF PRESENT ILLNESS  Gen Meza" is a 69 y.o. male who we have been following for dysphonia and right vocal fold SCC.     25  Still with poor voice quality. Some pain in the right jaw. Otherwise no issues. He completed a course of steroids    3/25/25  He is s/p MDL with CO2 laser supraglottoplasty and excision of bilateral VF lesions 25. 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 He reports overall doing well. His voice has been slow to come back.     25  The patient reports voice issues since last summer, worse in the morning. Some irritation if he talks a lot. 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 Denies weight loss, no hemoptysis.    PMH: HTN, HLD  Meds: he is on a blood thinner although it is not listed in his med list, he is not sure the name  SH: quit smoking 35-40 years ago, previously 1ppd, used to drink 2-3 drinks a week but has since quit because it bothered his throat    ADDITIONAL HISTORY  Past Medical History  He has a past medical history of Coronary artery disease, Disease of thyroid gland, Hyperlipidemia, and Hypertension. Surgical History  He has a past surgical history that includes Colonoscopy and Cardiac catheterization (N/A, 2024).   Social History  He reports that he quit smoking about 40 years ago. His smoking use included cigarettes. He has never used smokeless tobacco. He reports " current alcohol use of about 2.0 - 3.0 standard drinks of alcohol per week. He reports that he does not currently use drugs. Allergies  Patient has no known allergies.     Family History  Family History   Problem Relation Name Age of Onset    Alcohol abuse Mother      Cirrhosis Mother      Pancreatitis Father          REVIEW OF SYSTEMS  All 10 systems were reviewed and negative except for above.      PHYSICAL EXAM  ENT Physical Exam   GENERAL: Well-nourished and developed, alert and appropriate, no distress, voice [id number]  RESPIRATORY: Breathing quietly, no stridor  HEAD: Normocephalic atraumatic  FACE: Symmetric, no masses or lesions  EYES:  Pupils reactive, sclera clear, external ocular muscles intact, no nystagmus.    EARS:  Pinnae normal.   NOSE:  No anterior lesions, masses or polyps.  ORAL CAVITY/OROPHARYNX:  Buccal mucosa is moist without lesions or masses, tongue midline and palate elevates symmetrically. Tongue mobility intact.  NECK:  Soft. There is no abnormal lymphadenopathy or thyromegaly.    NEUROLOGIC:  Cranial nerves II-XII grossly intact.       Last Recorded Vitals  There were no vitals taken for this visit.    RESULTS    Patient Reported Outcome Measures  N/A    Laboratory, Radiology, and Pathology  I personally reviewed the following results, with the following interpretation:   Path 3/25/25  FINAL DIAGNOSIS   A.  False vocal cord, right, excision:  - Respiratory mucosa with squamous metaplasia and underlying seromucinous glands, negative for high-grade dysplasia and carcinoma.     B.  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     C.  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     D.  Larynx, right infraglottis, excision:  - High-grade dysplasia, negative for invasive carcinoma.     E.  Ventricle, right, excision:  - Denuded  epithelium with underlying fibrovascular tissue, and seromucinous glands, see note.  - Detached fragment of dysplastic epithelium, see note.     Note: The denuded epithelium is thin, with little epithelium available for evaluation.  Dysplasia cannot be entirely ruled out.  The small detached fragment of dysplastic epithelium does not show connection to the connective tissue with multiple deeper levels examined.  Clinical correlation is recommended.     F.  False vocal cord, left, excision:  - Respiratory mucosa with squamous metaplasia and underlying seromucinous glands, negative for high-grade dysplasia and carcinoma.     G.  True vocal cord, left, excision:  - Squamous mucosa with high-grade dysplasia with focal areas concerning for superficial invasion, see note.     Note: Multiple deeper levels were examined.   There are focal areas concerning for but not diagnostic of definitive invasion.  Clinical correlation is recommended.       CT neck 2/5/25  IMPRESSION:  Mild soft tissue thickening and irregularity of bilateral true vocal  cords, likely corresponding to lesion seen on laryngoscopic exam. No  extension into the supraglottic or subglottic larynx. No cartilage  invasion or extra laryngeal extension.      No cervical lymphadenopathy.      Posterior osteophytes versus ossification of posterior longitudinal  ligament at C2-3 C3-4 resulting in at least mild-to-moderate  narrowing of the spinal canal.    Cardiac catheterization May 2024 - normal coronaries with normal LV function.        PROCEDURES  Flexible Fiberoptic Laryngoscopy with Stroboscopy    Patient failed a mirror exam due to limitations of equipment and the need for stroboscopy to assess glottic vibration and closure.     PREOPERATIVE DIAGNOSIS: Dysphonia    POSTOPERATIVE DIAGNOSIS: Same    PROCEDURE:  Strobovideolaryngoscopy    ANESTHESIA:  Topical    COMPLICATIONS:  None    SPECIMENS:  None    PROCEDURE IN DETAIL: The patient was seated in an  upright position.  The nasal cavity was topically decongested and anesthetized.  The stroboscopic microphone was held to the neck at the level of the larynx.  The distal chip video laryngoscope was passed through the nasal cavity.  The nasal cavity and nasopharynx were within normal limits except noted below.  The following findings on stroboscopy were noted:      Tongue Base: no masses or lesions   Vocal Fold Mobility               Right VF: mobile               Left VF: mobile   TVF Appearance               Edema/Erythema: mild-moderate edema and erythema               Lesions/vibratory margin irregularities: thickened epithelium bilaterally, granulation on left   Glottic Closure Pattern: complete    Vibration:               Phase: asymmetric   Periodicity: irregular               Amplitude: absent               Waveform: absent    Muscle Tension Patterns:  lateral   Other Findings: healing granulation along bilateral false folds    The patient tolerated the procedure well.     4/22/25      3/24/25      Original lesion        ----------------------------------------------------------------------  Julia Tello MD, MAEd    [facility]  Department of Otolaryngology - Head and Neck Surgery  [facility]    The total time I spent in care of this patient today (excluding time spent on other billable services) is as follows:    Time Spent  Prep time on day of patient encounter: 5 minutes  Time spent directly with patient, family or caregiver: 15 minutes  Additional Time Spent on Patient Care Activities: 5 minutes  Documentation Time: 5 minutes  Other Time Spent: 0 minutes  Total: 30 minutes

## 2025-04-28 ENCOUNTER — ANESTHESIA EVENT (OUTPATIENT)
Dept: GASTROENTEROLOGY | Facility: EXTERNAL LOCATION | Age: 70
End: 2025-04-28

## 2025-04-29 ENCOUNTER — APPOINTMENT (OUTPATIENT)
Dept: CARDIOLOGY | Facility: CLINIC | Age: 70
End: 2025-04-29
Payer: MEDICARE

## 2025-05-08 ENCOUNTER — APPOINTMENT (OUTPATIENT)
Dept: GASTROENTEROLOGY | Facility: EXTERNAL LOCATION | Age: 70
End: 2025-05-08
Payer: MEDICARE

## 2025-05-08 ENCOUNTER — ANESTHESIA (OUTPATIENT)
Dept: GASTROENTEROLOGY | Facility: EXTERNAL LOCATION | Age: 70
End: 2025-05-08

## 2025-05-08 VITALS
WEIGHT: 195 LBS | TEMPERATURE: 96.8 F | BODY MASS INDEX: 27.3 KG/M2 | HEIGHT: 71 IN | HEART RATE: 58 BPM | SYSTOLIC BLOOD PRESSURE: 155 MMHG | OXYGEN SATURATION: 98 % | DIASTOLIC BLOOD PRESSURE: 88 MMHG | RESPIRATION RATE: 14 BRPM

## 2025-05-08 DIAGNOSIS — Z86.0100 HISTORY OF COLON POLYPS: ICD-10-CM

## 2025-05-08 DIAGNOSIS — E78.5 HYPERLIPIDEMIA, UNSPECIFIED: ICD-10-CM

## 2025-05-08 PROCEDURE — 45385 COLONOSCOPY W/LESION REMOVAL: CPT | Performed by: INTERNAL MEDICINE

## 2025-05-08 RX ORDER — LIDOCAINE HYDROCHLORIDE 20 MG/ML
INJECTION, SOLUTION INFILTRATION; PERINEURAL AS NEEDED
Status: DISCONTINUED | OUTPATIENT
Start: 2025-05-08 | End: 2025-05-08

## 2025-05-08 RX ORDER — SODIUM CHLORIDE 9 MG/ML
INJECTION, SOLUTION INTRAVENOUS CONTINUOUS PRN
Status: DISCONTINUED | OUTPATIENT
Start: 2025-05-08 | End: 2025-05-08

## 2025-05-08 RX ORDER — PROPOFOL 10 MG/ML
INJECTION, EMULSION INTRAVENOUS AS NEEDED
Status: DISCONTINUED | OUTPATIENT
Start: 2025-05-08 | End: 2025-05-08

## 2025-05-08 RX ORDER — SIMVASTATIN 40 MG/1
40 TABLET, FILM COATED ORAL DAILY
Qty: 90 TABLET | Refills: 3 | Status: SHIPPED | OUTPATIENT
Start: 2025-05-08 | End: 2026-05-08

## 2025-05-08 RX ADMIN — SODIUM CHLORIDE: 9 INJECTION, SOLUTION INTRAVENOUS at 12:01

## 2025-05-08 RX ADMIN — PROPOFOL 25 MG: 10 INJECTION, EMULSION INTRAVENOUS at 12:12

## 2025-05-08 RX ADMIN — PROPOFOL 100 MG: 10 INJECTION, EMULSION INTRAVENOUS at 12:03

## 2025-05-08 RX ADMIN — LIDOCAINE HYDROCHLORIDE 30 MG: 20 INJECTION, SOLUTION INFILTRATION; PERINEURAL at 12:03

## 2025-05-08 RX ADMIN — PROPOFOL 50 MG: 10 INJECTION, EMULSION INTRAVENOUS at 12:07

## 2025-05-08 SDOH — HEALTH STABILITY: MENTAL HEALTH: CURRENT SMOKER: 0

## 2025-05-08 ASSESSMENT — PAIN - FUNCTIONAL ASSESSMENT
PAIN_FUNCTIONAL_ASSESSMENT: 0-10

## 2025-05-08 ASSESSMENT — PAIN SCALES - GENERAL
PAINLEVEL_OUTOF10: 0 - NO PAIN
PAINLEVEL_OUTOF10: 0 - NO PAIN
PAIN_LEVEL: 0
PAINLEVEL_OUTOF10: 0 - NO PAIN
PAINLEVEL_OUTOF10: 0 - NO PAIN

## 2025-05-08 NOTE — ANESTHESIA POSTPROCEDURE EVALUATION
"Patient: Gen Govea \"Rich\"    Procedure Summary       Date: 05/08/25 Room / Location: [facility]    Anesthesia Start: 1201 Anesthesia Stop:     Procedure: COLONOSCOPY Diagnosis: History of colon polyps    Scheduled Providers: Cem Jacobson MD; SHERICE Mosqueda Responsible Provider: SHERICE Mosqueda    Anesthesia Type: MAC ASA Status: 3            Anesthesia Type: MAC    Vitals Value Taken Time   /68 05/08/25 12:19   Temp 36 05/08/25 12:19   Pulse 56 05/08/25 12:19   Resp 17 05/08/25 12:19   SpO2 99 05/08/25 12:19       Anesthesia Post Evaluation    Patient location during evaluation: bedside  Patient participation: complete - patient participated  Level of consciousness: awake  Pain score: 0  Pain management: adequate  Airway patency: patent  Cardiovascular status: acceptable  Respiratory status: acceptable and room air  Hydration status: acceptable  Postoperative Nausea and Vomiting: none        No notable events documented.    "

## 2025-05-08 NOTE — H&P
Procedure H&P    Patient Profile-Procedures  Name Gen Govea  Date of Birth 1955  MRN [id number]  Address   [address] [place] [id number] [place] [address]    Primary Phone Number [phone]  Secondary Phone Number    PCP Clinton Padilla    Procedure(s):  Procedures: Colonoscopy  Primary contact name and number   Extended Emergency Contact Information  Primary Emergency Contact: JONES GOVEA  Address: [address] [place] [place] [address] [facility]  Home Phone: [phone]  Mobile Phone: [phone]  Relation: Spouse    General Health  Weight   Vitals:    05/08/25 1148   Weight: 88.5 kg (195 lb)     BMI Body mass index is 27.2 kg/m².    Allergies  RX Allergies[1]    Past Medical History   Medical History[2]    Provider assessment  Diagnosis: Colon Cancer Screening/Surveillance   Medication Reviewed - yes  Prior to Admission medications    Medication Sig Start Date End Date Taking? Authorizing Provider   amLODIPine (Norvasc) 10 mg tablet TAKE 1 TABLET BY MOUTH EVERY DAY 2/10/25  Yes Clinton Padilla MD   aspirin 81 mg EC tablet Take 1 tablet (81 mg) by mouth once daily. 4/21/22  Yes Historical Provider, MD   busPIRone (Buspar) 15 mg tablet Take 1 tablet (15 mg) by mouth 2 times a day as needed (ANXIETY). 4/14/25 4/14/26 Yes Clinton Padilla MD   carvedilol (Coreg) 3.125 mg tablet Take 1 tablet (3.125 mg) by mouth once daily. 7/24/24 7/24/25 Yes Jacob James MD   cholecalciferol (Vitamin D-3) 50 mcg (2,000 unit) capsule Take 1 capsule (50 mcg) by mouth once daily. 1/30/23  Yes Historical Provider, MD   finasteride (Proscar) 5 mg tablet Take 1 tablet (5 mg) by mouth once daily. Do not crush, chew, or split. 4/14/25 4/14/26 Yes Clinton Padilla MD   hydroCHLOROthiazide (Microzide) 12.5 mg capsule Take 1 capsule (12.5 mg) by mouth once daily.   Yes Historical Provider, MD   levothyroxine (Synthroid, Levoxyl) 125 mcg tablet TAKE 1  TABLET BY MOUTH EVERY DAY 2/10/25  Yes Clinton Padilla MD   pantoprazole (ProtoNix) 40 mg EC tablet TAKE 1 TABLET (40 MG) BY MOUTH ONCE DAILY IN THE MORNING. TAKE BEFORE MEALS. DO NOT CRUSH, CHEW, OR SPLIT. 4/17/25 5/17/25 Yes Julia Tello MD   PARoxetine (Paxil) 20 mg tablet TAKE 1 TABLET BY MOUTH EVERY DAY 12/30/24  Yes Clinton Padilla MD   simvastatin (Zocor) 40 mg tablet Take 1 tablet (40 mg) by mouth once daily. 5/8/25 5/8/26 Yes Jacob James MD   tamsulosin (Flomax) 0.4 mg 24 hr capsule TAKE 1 CAPSULE BY MOUTH ONCE DAILY. 2/13/25  Yes Clinton Padilla MD   traZODone (Desyrel) 50 mg tablet TAKE 1 TABLET BY MOUTH EVERYDAY AT BEDTIME 2/10/25  Yes Clinton Padilla MD   valsartan-hydrochlorothiazide (Diovan HCT) 320-12.5 mg tablet Take 1 tablet by mouth once daily. 6/28/24 6/28/25 Yes Jacob James MD   sodium,potassium,mag sulfates (Suprep) 17.5-3.13-1.6 gram solution Take one bottle twice as directed by the prep instructions 4/22/25 5/8/25 Yes Hipolito Coello MD   simvastatin (Zocor) 40 mg tablet Take 1 tablet (40 mg) by mouth once daily. 5/20/24 5/8/25  Jacob James MD       Physical Exam  Vitals:    05/08/25 1148   BP: (!) 156/91   Pulse: 67   Resp: 15   Temp: 36.6 °C (97.9 °F)   SpO2: 95%        General: A&Ox3, NAD.  HEENT: AT/NC.   CV: RRR. No murmur.  Resp: CTA bilaterally. No wheezing, rhonchi or rales.   GI: Soft, NT/ND. BSx4.  Extrem: No edema. Pulses intact.  Neuro: No focal deficits.   Psych: Normal mood and affect.      Procedure Plan - pre-procedural (re)assesment completed by physician:  discharge/transfer patient when discharge criteria met    ASA status 3  Mallampati score 1    Cem Jacobson MD  5/8/2025 12:02 PM           [1] No Known Allergies  [2]   Past Medical History:  Diagnosis Date    Anxiety     Coronary artery disease     Disease of thyroid gland     Hyperlipidemia     Hypertension

## 2025-05-08 NOTE — ANESTHESIA PREPROCEDURE EVALUATION
"Patient: Gen Govea \"Rich\"    Procedure Information       Date/Time: 25 1130    Scheduled providers: Cem Jacobson MD; WILMA Mosqueda-CRNA    Procedure: COLONOSCOPY    Location: [facility]            Relevant Problems   Cardiac   (+) Angina pectoris, unstable (Multi)   (+) Chest pain with normal coronary angiography   (+) Hyperlipidemia   (+) Hypertension   (+) Symptomatic sinus bradycardia   (+) Systolic murmur      Neuro   (+) Primary dysthymia      Endocrine   (+) Hypothyroidism       Clinical information reviewed:   Tobacco  Allergies    Med Hx  Surg Hx   Fam Hx          NPO Detail:  No data recorded     Physical Exam    Airway  Mallampati: I  TM distance: >3 FB  Neck ROM: full  Mouth openin finger widths     Cardiovascular   Rhythm: regular  Rate: normal     Dental - normal exam     Pulmonary Breath sounds clear to auscultation     Abdominal      Other findings: Vocal cord surgery for nodule removal in 2025  Hoarseness        Anesthesia Plan    History of general anesthesia?: yes  History of complications of general anesthesia?: no    ASA 3     MAC   (Preoxygenated 2L prior to procedure.  Patient positioned self to comfort prior to sedation administered; eyes closed; continuous monitoring.)  The patient is not a current smoker.    intravenous induction   Anesthetic plan and risks discussed with patient.    Plan discussed with CRNA.      "

## 2025-05-08 NOTE — TELEPHONE ENCOUNTER
Received request for prescription refills for patient.   Patient follows with Dr. Jmaes    Request is for simvastatin  Is patient currently on medication yes    Last OV 2/24/2025  Next OV 6/25/2025    Pended for signing and sent to provider

## 2025-05-08 NOTE — DISCHARGE INSTRUCTIONS

## 2025-05-19 LAB
LABORATORY COMMENT REPORT: NORMAL
PATH REPORT.FINAL DX SPEC: NORMAL
PATH REPORT.GROSS SPEC: NORMAL
PATH REPORT.TOTAL CANCER: NORMAL

## 2025-05-20 ENCOUNTER — OFFICE VISIT (OUTPATIENT)
Dept: OTOLARYNGOLOGY | Facility: CLINIC | Age: 70
End: 2025-05-20
Payer: MEDICARE

## 2025-05-20 VITALS — HEIGHT: 71 IN | TEMPERATURE: 97.2 F | WEIGHT: 193 LBS | BODY MASS INDEX: 27.02 KG/M2

## 2025-05-20 DIAGNOSIS — R49.0 MUSCLE TENSION DYSPHONIA: ICD-10-CM

## 2025-05-20 DIAGNOSIS — C32.9 MALIGNANT NEOPLASM OF LARYNX: Primary | ICD-10-CM

## 2025-05-20 DIAGNOSIS — R49.0 DYSPHONIA: ICD-10-CM

## 2025-05-20 PROCEDURE — 99214 OFFICE O/P EST MOD 30 MIN: CPT | Performed by: OTOLARYNGOLOGY

## 2025-05-20 PROCEDURE — 99214 OFFICE O/P EST MOD 30 MIN: CPT | Mod: 25 | Performed by: OTOLARYNGOLOGY

## 2025-05-20 PROCEDURE — 1125F AMNT PAIN NOTED PAIN PRSNT: CPT | Performed by: OTOLARYNGOLOGY

## 2025-05-20 PROCEDURE — 3008F BODY MASS INDEX DOCD: CPT | Performed by: OTOLARYNGOLOGY

## 2025-05-20 PROCEDURE — 1159F MED LIST DOCD IN RCRD: CPT | Performed by: OTOLARYNGOLOGY

## 2025-05-20 PROCEDURE — 1160F RVW MEDS BY RX/DR IN RCRD: CPT | Performed by: OTOLARYNGOLOGY

## 2025-05-20 PROCEDURE — 1036F TOBACCO NON-USER: CPT | Performed by: OTOLARYNGOLOGY

## 2025-05-20 PROCEDURE — 31579 LARYNGOSCOPY TELESCOPIC: CPT | Performed by: OTOLARYNGOLOGY

## 2025-05-20 ASSESSMENT — PAIN SCALES - GENERAL: PAINLEVEL_OUTOF10: 5

## 2025-05-20 NOTE — PROGRESS NOTES
"Patient: Gen Govea   MRN: [id number] YOB: 1955   Sex: male Age: 69 y.o.  Date of Service: 2025       ASSESSMENT AND PLAN  I discussed the findings with Gen Govea \"Haroldo\" and have recommended the followin. 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 s/p MDL with CO2 laser supraglottoplasty and excision of bilateral VF lesions 25. Healing as expected. We discussed the need for close monitoring an possible office laser as needed  - Follow-up 4-6 weeks for repeat strobe.  - RTC for bronchoscopy, VF steroid injection (decadron)  - Voice therapy for secondary muscle tension      CHIEF COMPLAINT  Dysphonia    HISTORY OF PRESENT ILLNESS  Gen Meza" is a 69 y.o. male who we have been following for dysphonia and right vocal fold SCC, s/p MDL with CO2 laser supraglottoplasty and excision of bilateral VF lesions 25.    25  Here for follow-up. Voice unchanged and he gets pain in his throat if he talks too much.     25  Still with poor voice quality. Some pain in the right jaw. Otherwise no issues. He completed a course of steroids    3/25/25  He is s/p MDL with CO2 laser supraglottoplasty and excision of bilateral VF lesions 25. 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 He reports overall doing well. His voice has been slow to come back.     25  The patient reports voice issues since last summer, worse in the morning. Some irritation if he talks a lot. 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 Denies weight loss, no hemoptysis.    PMH: HTN, HLD  Meds: he is on a blood thinner although it is not listed in his med list, he is not sure the name  SH: quit smoking 35-40 years ago, previously 1ppd, used to drink 2-3 drinks a week but has since quit because it bothered his throat    ADDITIONAL HISTORY  Past Medical History  He has a past medical history of Anxiety, Coronary artery disease, Disease of thyroid gland, Hyperlipidemia, " and Hypertension. Surgical History  He has a past surgical history that includes Colonoscopy; Cardiac catheterization (N/A, 05/28/2024); and Larynx surgery.   Social History  He reports that he quit smoking about 40 years ago. His smoking use included cigarettes. He has never used smokeless tobacco. He reports that he does not currently use alcohol after a past usage of about 2.0 - 3.0 standard drinks of alcohol per week. He reports that he does not currently use drugs. Allergies  Patient has no known allergies.     Family History  Family History   Problem Relation Name Age of Onset    Alcohol abuse Mother      Cirrhosis Mother      Pancreatitis Father          REVIEW OF SYSTEMS  All 10 systems were reviewed and negative except for above.      PHYSICAL EXAM  ENT Physical Exam   GENERAL: Well-nourished and developed, alert and appropriate, no distress, voice [id number]  RESPIRATORY: Breathing quietly, no stridor  HEAD: Normocephalic atraumatic  FACE: Symmetric, no masses or lesions  EYES:  Pupils reactive, sclera clear, external ocular muscles intact, no nystagmus.    EARS:  Pinnae normal.   NOSE:  No anterior lesions, masses or polyps.  ORAL CAVITY/OROPHARYNX:  Buccal mucosa is moist without lesions or masses, tongue midline and palate elevates symmetrically. Tongue mobility intact.  NECK:  Soft. There is no abnormal lymphadenopathy or thyromegaly.    NEUROLOGIC:  Cranial nerves II-XII grossly intact.       Last Recorded Vitals  There were no vitals taken for this visit.    RESULTS    Patient Reported Outcome Measures  N/A    Laboratory, Radiology, and Pathology  I personally reviewed the following results, with the following interpretation:   Path 3/25/25  FINAL DIAGNOSIS   A.  False vocal cord, right, excision:  - Respiratory mucosa with squamous metaplasia and underlying seromucinous glands, negative for high-grade dysplasia and carcinoma.     B.  True vocal cord, right, lesion, excision:  - Invasive keratinizing  moderately differentiated squamous cell carcinoma.     C.  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     D.  Larynx, right infraglottis, excision:  - High-grade dysplasia, negative for invasive carcinoma.     E.  Ventricle, right, excision:  - Denuded epithelium with underlying fibrovascular tissue, and seromucinous glands, see note.  - Detached fragment of dysplastic epithelium, see note.     Note: The denuded epithelium is thin, with little epithelium available for evaluation.  Dysplasia cannot be entirely ruled out.  The small detached fragment of dysplastic epithelium does not show connection to the connective tissue with multiple deeper levels examined.  Clinical correlation is recommended.     F.  False vocal cord, left, excision:  - Respiratory mucosa with squamous metaplasia and underlying seromucinous glands, negative for high-grade dysplasia and carcinoma.     G.  True vocal cord, left, excision:  - Squamous mucosa with high-grade dysplasia with focal areas concerning for superficial invasion, see note.     Note: Multiple deeper levels were examined.   There are focal areas concerning for but not diagnostic of definitive invasion.  Clinical correlation is recommended.       CT neck 2/5/25  IMPRESSION:  Mild soft tissue thickening and irregularity of bilateral true vocal  cords, likely corresponding to lesion seen on laryngoscopic exam. No  extension into the supraglottic or subglottic larynx. No cartilage  invasion or extra laryngeal extension.      No cervical lymphadenopathy.      Posterior osteophytes versus ossification of posterior longitudinal  ligament at C2-3 C3-4 resulting in at least mild-to-moderate  narrowing of the spinal canal.    Cardiac catheterization May 2024 - normal coronaries with normal LV function.        PROCEDURES  Flexible Fiberoptic Laryngoscopy with Stroboscopy    Patient failed a mirror exam  due to limitations of equipment and the need for stroboscopy to assess glottic vibration and closure.     PREOPERATIVE DIAGNOSIS: Dysphonia    POSTOPERATIVE DIAGNOSIS: Same    PROCEDURE:  Strobovideolaryngoscopy    ANESTHESIA:  Topical    COMPLICATIONS:  None    SPECIMENS:  None    PROCEDURE IN DETAIL: The patient was seated in an upright position.  The nasal cavity was topically decongested and anesthetized.  The stroboscopic microphone was held to the neck at the level of the larynx.  The distal chip video laryngoscope was passed through the nasal cavity.  The nasal cavity and nasopharynx were within normal limits except noted below.  The following findings on stroboscopy were noted:      Tongue Base: no masses or lesions   Vocal Fold Mobility               Right VF: mobile               Left VF: mobile   TVF Appearance               Edema/Erythema: mild-moderate edema and erythema               Lesions/vibratory margin irregularities: thickened epithelium bilaterally, granulation on left   Glottic Closure Pattern: complete    Vibration:               Phase: asymmetric   Periodicity: irregular               Amplitude: absent               Waveform: absent    Muscle Tension Patterns:  lateral   Other Findings: increased granulation left false fold, polyp on right left midcord stable from prior    The patient tolerated the procedure well.     5/20/25      4/22/25      3/24/25      Original lesion        ----------------------------------------------------------------------  Julia Tello MD, MAEd    [facility]  Department of Otolaryngology - Head and Neck Surgery  [facility]    The total time I spent in care of this patient today (excluding time spent on other billable services) is as follows:    Time Spent  Prep time on day of patient encounter: 5 minutes  Time spent directly with patient, family or caregiver: 15 minutes  Additional  Time Spent on Patient Care Activities: 5 minutes  Documentation Time: 5 minutes  Other Time Spent: 0 minutes  Total: 30 minutes

## 2025-05-27 ENCOUNTER — EVALUATION (OUTPATIENT)
Dept: SPEECH THERAPY | Facility: CLINIC | Age: 70
End: 2025-05-27
Payer: MEDICARE

## 2025-05-27 DIAGNOSIS — J38.2 VOCAL CORD NODULE: Primary | ICD-10-CM

## 2025-05-27 DIAGNOSIS — R49.0 MUSCLE TENSION DYSPHONIA: ICD-10-CM

## 2025-05-27 PROCEDURE — 92524 BEHAVRAL QUALIT ANALYS VOICE: CPT | Mod: GN

## 2025-05-27 NOTE — PROGRESS NOTES
"    Voice Evaluation       Patient Name: Gen Govea \"Edwin"  MRN: [id number]  : 1955  Today's Date: 25  Time Calculation  Start Time: 1300  Stop Time: 1345  Time Calculation (min): 45 min     Current Problem:  Diagnosis:   Vocal nodule  Muscle tension dysphonia     Voice Assessment:   Oral motor examination:  Haroldo Govea oral motor examination was within functional limits for function and structure.  No facial asymmetry or weakness noted.  Dentition was within functional limits.  Patient was able to complete diadochokinetic testing was within functional limits.  Upper back and neck musculature had mild increased tone in upper back and neck musculature.  Patient was able to respond well to manual therapy techniques.     Patient was able to count from 1-21 using 2 breaths and recite the alphabet using 2 breaths.  Patient was able to read the rainbow passage with aphonic strain vocal quality.  During the assessment the patient had evidence of clavicular breathing patterns.      Patient was able to sustain the /a/ phoneme for 5.23 seconds, the /i/ phoneme for 6.0 seconds, and the /u/ phoneme for 6.49 seconds with all productions being aphonic.  Patient was able to complete pitch changes for both incremental and glide pitch changes with inability to change pitch due to aphonic voice production.  No improvement with attempts to increase pitch.  He was able to complete loudness change techniques with flat loudness change ability for increasing his loudness and mild ability to decrease his volume.    An S to Z ratio was obtained.  The patient was able to sustain the /S/ phoneme for 6.54 seconds and the /Z/ phoneme for 1.2 seconds with aphonic quality.  Patient's S/Z ratio was 5.45.  A normal adult male should be able to sustain an S or Z phoneme for an average of 20-25 seconds.  This indicates a severe deficit in breath support for voice and severe vocal fold dysfunction.    Patient was given a vocally " "abusive checklist sheet to complete at home and bring to his initial session.  He was also instructed to increase his H2O intake to improve vocal hygiene.  He was also given a abdominal breathing techniques sheet to take home and practice 2 to 3 times per day.        VHI-10  VHI-10  My voice makes it difficult for people to hear me.: Almost always  People have difficulty understanding me in a noisy room.: Always  My voice difficulties restrict my personal and social life.: Almost always  I feel left out of the conversations because of my voice: Always  My voice problem causes me to lose income.: Always  I feel as though I have to strain to produce voice.: Always  The clarity of my voice is unpredictable.: Always  My voice problem upsets me.: Always  My voice makes me feel handicapped.: Always  People ask, \"What's wrong with your voice?\": Almost always  VHI-10 Total Score: 37        Voice Plan of Care:  Frequency: 1 time per week  Duration: 12 Weeks  Number of Visits: 12  Skilled SLP warranted for voice treatment due to patient current vocal deficits, need for education and completion of vocal techniques to improve. Without skilled intervention, patient will not improve.   Recommendations for therapeutic interventions: Speech/Voice exercises  Prognosis: Good  Discussed Plan of Care: Patient  Patient/Caregiver Demonstrated Understanding: yes  Risk and Benefits Discussed with Patient/Caregiver/Other: yes    Subjective:  Haroldo Govea was seen for a voice evaluation at [facility] on 05/27/25. Patient was seen for a 45 minute evaluation.     General Visit Information:  Living Environment: Home  Arrival: Independent  Ordering Physician: Dr. Julia Tello, ENT  Reason for Referral: muscle tension dysphonia, vocal nodule   Past Medical History Relevant to Rehab: Patient has past medical history of anxiety, coronary artery disease, disease of thyroid gland, hyperlipidemia and hypertension.    Certification Period " Start Date: 05/27/2025  Certification Period End Date: 08/25/2025  Total Number of Visits : 1  Date of Onset: 05/20/2025     Goals:  1.  Pt will be able to tolerate manual therapy techniques to upper back and neck musculature.     2.  Pt will be able to complete breath support techniques with 90% accuracy.     3.  Pt will be able to complete vocal warmup techniques with 90% accuracy.     4.  Pt will be able to complete pitch change techniques with 90% accuracy.     5.  Pt will be able to complete a home program 2-3 times per day.      Pain Assessment:  Pain Assessment: 0-10  Pain Score: 0 - No pain      Education:  Individual(s) Educated: Patient  Verbal Education: Results of testing, Exercises  Written Education : Abdominal breathing  Response to Education: Verbalized understanding  Patient/Caregiver Verbalized Understanding and Agreement: Yes    Treatment:   Treatment provided: Treatment not provided during evaluation session.    This documentation was completed using the Dragon Dictation system. There may be spelling and/or grammatical errors that were not corrected prior to final submission.

## 2025-05-28 PROBLEM — R49.0 MUSCLE TENSION DYSPHONIA: Status: ACTIVE | Noted: 2025-05-28

## 2025-06-10 ENCOUNTER — PROCEDURE VISIT (OUTPATIENT)
Dept: OTOLARYNGOLOGY | Facility: CLINIC | Age: 70
End: 2025-06-10
Payer: MEDICARE

## 2025-06-10 VITALS
WEIGHT: 194.78 LBS | HEIGHT: 71 IN | TEMPERATURE: 98.1 F | DIASTOLIC BLOOD PRESSURE: 85 MMHG | BODY MASS INDEX: 27.27 KG/M2 | SYSTOLIC BLOOD PRESSURE: 168 MMHG

## 2025-06-10 DIAGNOSIS — C32.9 MALIGNANT NEOPLASM OF LARYNX: ICD-10-CM

## 2025-06-10 DIAGNOSIS — J38.7 LARYNGEAL GRANULOMA: ICD-10-CM

## 2025-06-10 DIAGNOSIS — R49.0 DYSPHONIA: Primary | ICD-10-CM

## 2025-06-10 PROCEDURE — 31573 LARGSC W/THER INJECTION: CPT | Performed by: OTOLARYNGOLOGY

## 2025-06-10 PROCEDURE — 99214 OFFICE O/P EST MOD 30 MIN: CPT | Mod: 25 | Performed by: OTOLARYNGOLOGY

## 2025-06-10 PROCEDURE — 99214 OFFICE O/P EST MOD 30 MIN: CPT | Performed by: OTOLARYNGOLOGY

## 2025-06-10 PROCEDURE — 2500000004 HC RX 250 GENERAL PHARMACY W/ HCPCS (ALT 636 FOR OP/ED): Performed by: OTOLARYNGOLOGY

## 2025-06-10 PROCEDURE — 96372 THER/PROPH/DIAG INJ SC/IM: CPT | Mod: 59 | Performed by: OTOLARYNGOLOGY

## 2025-06-10 RX ADMIN — DEXAMETHASONE SODIUM PHOSPHATE 4 MG: 4 INJECTION, SOLUTION INTRA-ARTICULAR; INTRALESIONAL; INTRAMUSCULAR; INTRAVENOUS; SOFT TISSUE at 09:30

## 2025-06-10 NOTE — PROGRESS NOTES
"Patient: Gen Govea   MRN: [id number] YOB: 1955   Sex: male Age: 69 y.o.  Date of Service: 6/10/2025       ASSESSMENT AND PLAN  I discussed the findings with Gen Govea \"Haroldo\" and have recommended the followin. 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 s/p MDL with CO2 laser supraglottoplasty and excision of bilateral VF lesions 25. Healing as expected. We discussed the need for close monitoring an possible office laser as needed  - Follow-up 4-6 weeks for repeat strobe.  - RTC for bronchoscopy, VF steroid injection (decadron)  - Voice therapy for secondary muscle tension      CHIEF COMPLAINT  Dysphonia    HISTORY OF PRESENT ILLNESS  Gen Govea \"Haroldo\" is a 69 y.o. male who we have been following for dysphonia and right vocal fold SCC, s/p MDL with CO2 laser supraglottoplasty and excision of bilateral VF lesions 25.    6/10/25  He saw Levi Smith for voice evaluation 25  Here for follow-up. Voice unchanged and he gets pain in his throat if he talks too much.     25  Still with poor voice quality. Some pain in the right jaw. Otherwise no issues. He completed a course of steroids    3/25/25  He is s/p MDL with CO2 laser supraglottoplasty and excision of bilateral VF lesions 25. 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 He reports overall doing well. His voice has been slow to come back.     25  The patient reports voice issues since last summer, worse in the morning. Some irritation if he talks a lot. 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 Denies weight loss, no hemoptysis.    PMH: HTN, HLD  Meds: he is on a blood thinner although it is not listed in his med list, he is not sure the name  SH: quit smoking 35-40 years ago, previously 1ppd, used to drink 2-3 drinks a week but has since quit because it bothered his throat    ADDITIONAL HISTORY  Past Medical History  He has a past medical history of Anxiety, " Coronary artery disease, Disease of thyroid gland, Hyperlipidemia, and Hypertension. Surgical History  He has a past surgical history that includes Colonoscopy; Cardiac catheterization (N/A, 05/28/2024); and Larynx surgery.   Social History  He reports that he quit smoking about 40 years ago. His smoking use included cigarettes. He has never used smokeless tobacco. He reports that he does not currently use alcohol after a past usage of about 2.0 - 3.0 standard drinks of alcohol per week. He reports that he does not currently use drugs. Allergies  Patient has no known allergies.     Family History  Family History   Problem Relation Name Age of Onset   • Alcohol abuse Mother     • Cirrhosis Mother     • Pancreatitis Father          REVIEW OF SYSTEMS  All 10 systems were reviewed and negative except for above.      PHYSICAL EXAM  ENT Physical Exam   GENERAL: Well-nourished and developed, alert and appropriate, no distress, voice [id number]  RESPIRATORY: Breathing quietly, no stridor  HEAD: Normocephalic atraumatic  FACE: Symmetric, no masses or lesions  EYES:  Pupils reactive, sclera clear, external ocular muscles intact, no nystagmus.    EARS:  Pinnae normal.   NOSE:  No anterior lesions, masses or polyps.  ORAL CAVITY/OROPHARYNX:  Buccal mucosa is moist without lesions or masses, tongue midline and palate elevates symmetrically. Tongue mobility intact.  NECK:  Soft. There is no abnormal lymphadenopathy or thyromegaly.    NEUROLOGIC:  Cranial nerves II-XII grossly intact.       Last Recorded Vitals  There were no vitals taken for this visit.    RESULTS    Patient Reported Outcome Measures  N/A    Laboratory, Radiology, and Pathology  I personally reviewed the following results, with the following interpretation:   Path 3/25/25  FINAL DIAGNOSIS   A.  False vocal cord, right, excision:  - Respiratory mucosa with squamous metaplasia and underlying seromucinous glands, negative for high-grade dysplasia and carcinoma.      B.  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     C.  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     D.  Larynx, right infraglottis, excision:  - High-grade dysplasia, negative for invasive carcinoma.     E.  Ventricle, right, excision:  - Denuded epithelium with underlying fibrovascular tissue, and seromucinous glands, see note.  - Detached fragment of dysplastic epithelium, see note.     Note: The denuded epithelium is thin, with little epithelium available for evaluation.  Dysplasia cannot be entirely ruled out.  The small detached fragment of dysplastic epithelium does not show connection to the connective tissue with multiple deeper levels examined.  Clinical correlation is recommended.     F.  False vocal cord, left, excision:  - Respiratory mucosa with squamous metaplasia and underlying seromucinous glands, negative for high-grade dysplasia and carcinoma.     G.  True vocal cord, left, excision:  - Squamous mucosa with high-grade dysplasia with focal areas concerning for superficial invasion, see note.     Note: Multiple deeper levels were examined.   There are focal areas concerning for but not diagnostic of definitive invasion.  Clinical correlation is recommended.       CT neck 2/5/25  IMPRESSION:  Mild soft tissue thickening and irregularity of bilateral true vocal  cords, likely corresponding to lesion seen on laryngoscopic exam. No  extension into the supraglottic or subglottic larynx. No cartilage  invasion or extra laryngeal extension.      No cervical lymphadenopathy.      Posterior osteophytes versus ossification of posterior longitudinal  ligament at C2-3 C3-4 resulting in at least mild-to-moderate  narrowing of the spinal canal.    Cardiac catheterization May 2024 - normal coronaries with normal LV function.        PROCEDURES  Flexible  Fiberoptic Laryngoscopy with Injection Laryngoplasty    PREOPERATIVE DIAGNOSIS: dysphonia, laryngeal granuloma    POSTOPERATIVE DIAGNOSIS: Same    PROCEDURE: Transnasal videolaryngoscopy/bronchoscopy with decadron injection of the left vocal fold    ANESTHESIA:  Topical    COMPLICATIONS:  None    SPECIMENS:  None    PROCEDURE IN DETAIL:  The risks, benefits, limitations, and alternatives of the procedure were described to the patient in detail.  The patient understood and wished to proceed.  The appropriate consents were obtained.    The patient was placed in the upright position in the procedure chair.  The nasal cavity was topically decongested anesthetized.  The distal chip video laryngoscope was passed through the nasal cavity.  The nasal cavity and nasopharynx were within normal limits except noted below. The following findings on laryngoscopy were noted:         Tongue Base: no masses or lesions           Left vocal fold mobility: ***immobile paramedian position       Right vocal fold mobility: mobile       Glottal closure: ***       Laryngeal muscle tension: ***       Symmetry: ***asymmetric       Vocal fold free edge: no masses or lesions       Trachea: subglottis patent       Other abnormalities: per above    Following the diagnostic laryngoscopy, we proceeded with the vocal fold injection.  The cervical skin was prepped in the usual clean fashion with an alcohol swab.  An ipsilateral superior laryngeal nerve block was performed and the region overlying the thyroid notch was infiltrated with 1% lidocaine with 1:100,000 units of epinephrine.  Video laryngoscopy was then performed with a channeled bronchoscope.  4 cc of 4% lidocaine was instilled over the vocal folds and into the trachea to provide laryngotracheal anesthesia.  Under endoscopic visualization, through the thyrohyoid approach, 1 cc of Prolaryn gel was injected into the affected vocal fold just lateral and anterior to the vocal process into the  thyroid arytenoid muscle.  This provided excellent medialization.  The vocal fold was overcorrected by approximately 20%.     The patient tolerated the procedure well. There were no immediate complications.    6/10/25    5/20/25      4/22/25      3/24/25      Original lesion        ----------------------------------------------------------------------  Julia Tello MD, MAEd    [facility]  Department of Otolaryngology - Head and Neck Surgery  [facility]    The total time I spent in care of this patient today (excluding time spent on other billable services) is as follows:    Time Spent  Prep time on day of patient encounter: 5 minutes  Time spent directly with patient, family or caregiver: 15 minutes  Additional Time Spent on Patient Care Activities: 5 minutes  Documentation Time: 5 minutes  Other Time Spent: 0 minutes  Total: 30 minutes

## 2025-06-22 DIAGNOSIS — I10 PRIMARY HYPERTENSION: ICD-10-CM

## 2025-06-23 RX ORDER — VALSARTAN AND HYDROCHLOROTHIAZIDE 320; 12.5 MG/1; MG/1
1 TABLET, FILM COATED ORAL DAILY
Qty: 90 TABLET | Refills: 3 | Status: SHIPPED | OUTPATIENT
Start: 2025-06-23 | End: 2026-06-23

## 2025-06-23 NOTE — TELEPHONE ENCOUNTER
Received request for prescription refills for patient.   Patient follows with Dr. James    Request is for valsartan-hydrochlorothiazide   Is patient currently on medication yes    Last OV 2/24/2025 with Dr. Wiggins  Next OV 6/25/2025    Pended for signing and sent to provider;venkat

## 2025-06-25 ENCOUNTER — APPOINTMENT (OUTPATIENT)
Dept: CARDIOLOGY | Facility: CLINIC | Age: 70
End: 2025-06-25
Payer: MEDICARE

## 2025-07-02 ENCOUNTER — TREATMENT (OUTPATIENT)
Dept: SPEECH THERAPY | Facility: CLINIC | Age: 70
End: 2025-07-02
Payer: MEDICARE

## 2025-07-02 DIAGNOSIS — J38.2 VOCAL CORD NODULE: Primary | ICD-10-CM

## 2025-07-02 DIAGNOSIS — R49.0 MUSCLE TENSION DYSPHONIA: ICD-10-CM

## 2025-07-02 PROCEDURE — 92507 TX SP LANG VOICE COMM INDIV: CPT | Mod: GN

## 2025-07-02 NOTE — PROGRESS NOTES
"Speech-Language Pathology    Outpatient Speech-Language Pathology Treatment      Patient Name: Gen Govea \"Haroldo\"  MRN: [id number]  : 1955  Today's Date: 25  Time Calculation  Start Time: 815  Stop Time: 855  Time Calculation (min): 40 min    Current Problem:   Vocal nodule  Muscle tension dysphonia    SLP Assessment:  Today was patient's first treatment session since his initial evaluation that was completed on 2025.  He reported that he had a follow-up appointment with Dr. Julia Tello, ENT on 06/10/2025.  Flexible fiberoptic laryngoscopy with vocal fold injection was completed during the session.  Dr. Antonio indicated findings of persistent granuloma of the left false vocal fold.  Also indicated both right and left vocal fold was mobile and that there was laryngeal muscle tension laterally and that she noted asymmetry.  Patient came into the session today with aphonic vocal quality with minimal to no noted vibration of vocal cords.  Due to the fact that this is the first treatment session that patient is having post initial voice evaluation, patient was given a vocal warm up technique sheet to take home and practice 2-3 times per day.  He was able to tolerate myofascial release techniques and was taught stretching techniques to improve upper back and neck musculature range of motion and function.  He was introduced to straw breathing during the initial evaluation and was encouraged to continue to practice straw breathing techniques 2-3 times per day.  Today he was able to sustain exhalation through a straw into water for 22.55 seconds.  For vocal warm up techniques patient initially had aphonic quality that was shaped into severe hoarse strained vocal quality.  Patient was encouraged to try to avoid whispering and increase his vibration of vocal cords for speech despite the harsh hoarse quality.  Encouraged to schedule for treatment sessions over the next 4 weeks with possibility of adding more " after the initial 4 weeks of treatment.  Is also encouraged to practice his home program 2-3 times per day.  Patient in agreement with this plan.  Continue with plan.    Voice Plan of Care:  Frequency: 1 time per week  Duration: 12 Weeks  Number of Visits: 12  Skilled SLP warranted for voice treatment due to patient current vocal deficits, need for education and completion of vocal techniques to improve. Without skilled intervention, patient will not improve.   Recommendations for therapeutic interventions: Speech/Voice exercises  Prognosis: Good  Discussed Plan of Care: Patient  Patient/Caregiver Demonstrated Understanding: yes  Risk and Benefits Discussed with Patient/Caregiver/Other: yes     Insurance:  Reviewed: Yes  Certification Period Start Date: 05/27/2025  Certification Period End Date: 08/25/2025  Total Number of Visits : 2  Date of Onset: 05/20/2025    Pain Assessment:  Pain Assessment: 0-10  Pain Score: 0 - No pain    Subjective:  Patient was seen today for a 40-minute voice treatment session.  This is the first treatment session since his initial eval was completed on 05/27/2025.    Goals: Goals established on 05/27/2025.-Anticipated mastery by discharge.  1.  Pt will be able to tolerate manual therapy techniques to upper back and neck musculature.      2.  Pt will be able to complete breath support techniques with 90% accuracy.      3.  Pt will be able to complete vocal warmup techniques with 90% accuracy.      4.  Pt will be able to complete pitch change techniques with 90% accuracy.      5.  Pt will be able to complete a home program 2-3 times per day.      Outpatient Education:    This documentation was completed using the Dragon Dictation system. There may be spelling and/or grammatical errors that were not corrected prior to final submission.

## 2025-07-08 ENCOUNTER — PROCEDURE VISIT (OUTPATIENT)
Dept: OTOLARYNGOLOGY | Facility: CLINIC | Age: 70
End: 2025-07-08
Payer: MEDICARE

## 2025-07-08 VITALS
DIASTOLIC BLOOD PRESSURE: 84 MMHG | TEMPERATURE: 97.9 F | BODY MASS INDEX: 27.45 KG/M2 | WEIGHT: 196.1 LBS | SYSTOLIC BLOOD PRESSURE: 171 MMHG | HEIGHT: 71 IN

## 2025-07-08 DIAGNOSIS — C32.9 MALIGNANT NEOPLASM OF LARYNX: ICD-10-CM

## 2025-07-08 DIAGNOSIS — R49.0 DYSPHONIA: Primary | ICD-10-CM

## 2025-07-08 DIAGNOSIS — J38.7 LARYNGEAL GRANULOMA: ICD-10-CM

## 2025-07-08 DIAGNOSIS — R49.0 MUSCLE TENSION DYSPHONIA: ICD-10-CM

## 2025-07-08 PROCEDURE — 2500000004 HC RX 250 GENERAL PHARMACY W/ HCPCS (ALT 636 FOR OP/ED): Performed by: OTOLARYNGOLOGY

## 2025-07-08 PROCEDURE — 31573 LARGSC W/THER INJECTION: CPT | Performed by: OTOLARYNGOLOGY

## 2025-07-08 PROCEDURE — 99214 OFFICE O/P EST MOD 30 MIN: CPT | Performed by: OTOLARYNGOLOGY

## 2025-07-08 PROCEDURE — 99214 OFFICE O/P EST MOD 30 MIN: CPT | Mod: 25 | Performed by: OTOLARYNGOLOGY

## 2025-07-08 PROCEDURE — 96372 THER/PROPH/DIAG INJ SC/IM: CPT | Performed by: OTOLARYNGOLOGY

## 2025-07-08 RX ADMIN — DEXAMETHASONE SODIUM PHOSPHATE 4 MG: 4 INJECTION, SOLUTION INTRA-ARTICULAR; INTRALESIONAL; INTRAMUSCULAR; INTRAVENOUS; SOFT TISSUE at 11:22

## 2025-07-08 NOTE — PROGRESS NOTES
"Patient: Gen Govea   MRN: [id number] YOB: 1955   Sex: male Age: 69 y.o.  Date of Service: 2025       ASSESSMENT AND PLAN  I discussed the findings with Gen Govea \"Haroldo\" and have recommended the followin. 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 s/p MDL with CO2 laser supraglottoplasty and excision of bilateral VF lesions 25. His voice has been slow to recover; s/p in office VF injection with decadron 6/10/25, 25  - Follow-up 6 weeks for repeat strobe and possible repeat injection  - Continue voice therapy for secondary muscle tension      CHIEF COMPLAINT  Dysphonia    HISTORY OF PRESENT ILLNESS  Gen Govea \"Haroldo\" is a 69 y.o. male who we have been following for dysphonia and right vocal fold SCC, s/p MDL with CO2 laser supraglottoplasty and excision of bilateral VF lesions 25.    25  Notes some episodes of a more normal voice. Seeing him back Weds    6/10/25  He saw Levi Smith for voice evaluation 25.     25  Here for follow-up. Voice unchanged and he gets pain in his throat if he talks too much.     25  Still with poor voice quality. Some pain in the right jaw. Otherwise no issues. He completed a course of steroids    3/25/25  He is s/p MDL with CO2 laser supraglottoplasty and excision of bilateral VF lesions 25. 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 He reports overall doing well. His voice has been slow to come back.     25  The patient reports voice issues since last summer, worse in the morning. Some irritation if he talks a lot. 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 Denies weight loss, no hemoptysis.    PMH: HTN, HLD  Meds: he is on a blood thinner although it is not listed in his med list, he is not sure the name  SH: quit smoking 35-40 years ago, previously 1ppd, used to drink 2-3 drinks a week but has since quit because it bothered his throat    ADDITIONAL HISTORY  Past Medical " History  He has a past medical history of Anxiety, Coronary artery disease, Disease of thyroid gland, Hyperlipidemia, Hypertension, and Voice disorder. Surgical History  He has a past surgical history that includes Colonoscopy; Cardiac catheterization (N/A, 05/28/2024); and Larynx surgery.   Social History  He reports that he quit smoking about 40 years ago. His smoking use included cigarettes. He has never used smokeless tobacco. He reports that he does not currently use alcohol after a past usage of about 3.0 standard drinks of alcohol per week. He reports that he does not currently use drugs. Allergies  Patient has no known allergies.     Family History  Family History   Problem Relation Name Age of Onset    Alcohol abuse Mother      Cirrhosis Mother      Pancreatitis Father          REVIEW OF SYSTEMS  All 10 systems were reviewed and negative except for above.      PHYSICAL EXAM  ENT Physical Exam   GENERAL: Well-nourished and developed, alert and appropriate, no distress, voice [id number]  RESPIRATORY: Breathing quietly, no stridor  HEAD: Normocephalic atraumatic  FACE: Symmetric, no masses or lesions  EYES:  Pupils reactive, sclera clear, external ocular muscles intact, no nystagmus.    EARS:  Pinnae normal.   NOSE:  No anterior lesions, masses or polyps.  ORAL CAVITY/OROPHARYNX:  Buccal mucosa is moist without lesions or masses, tongue midline and palate elevates symmetrically. Tongue mobility intact.  NECK:  Soft. There is no abnormal lymphadenopathy or thyromegaly.    NEUROLOGIC:  Cranial nerves II-XII grossly intact.       Last Recorded Vitals  There were no vitals taken for this visit.    RESULTS    Patient Reported Outcome Measures  N/A    Laboratory, Radiology, and Pathology  I personally reviewed the following results, with the following interpretation:   Path 3/25/25  FINAL DIAGNOSIS   A.  False vocal cord, right, excision:  - Respiratory mucosa with squamous metaplasia and underlying seromucinous  glands, negative for high-grade dysplasia and carcinoma.     B.  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     C.  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     D.  Larynx, right infraglottis, excision:  - High-grade dysplasia, negative for invasive carcinoma.     E.  Ventricle, right, excision:  - Denuded epithelium with underlying fibrovascular tissue, and seromucinous glands, see note.  - Detached fragment of dysplastic epithelium, see note.     Note: The denuded epithelium is thin, with little epithelium available for evaluation.  Dysplasia cannot be entirely ruled out.  The small detached fragment of dysplastic epithelium does not show connection to the connective tissue with multiple deeper levels examined.  Clinical correlation is recommended.     F.  False vocal cord, left, excision:  - Respiratory mucosa with squamous metaplasia and underlying seromucinous glands, negative for high-grade dysplasia and carcinoma.     G.  True vocal cord, left, excision:  - Squamous mucosa with high-grade dysplasia with focal areas concerning for superficial invasion, see note.     Note: Multiple deeper levels were examined.   There are focal areas concerning for but not diagnostic of definitive invasion.  Clinical correlation is recommended.       CT neck 2/5/25  IMPRESSION:  Mild soft tissue thickening and irregularity of bilateral true vocal  cords, likely corresponding to lesion seen on laryngoscopic exam. No  extension into the supraglottic or subglottic larynx. No cartilage  invasion or extra laryngeal extension.      No cervical lymphadenopathy.      Posterior osteophytes versus ossification of posterior longitudinal  ligament at C2-3 C3-4 resulting in at least mild-to-moderate  narrowing of the spinal canal.    Cardiac catheterization May 2024 - normal  coronaries with normal LV function.        PROCEDURES  Flexible Fiberoptic Laryngoscopy with Vocal Fold Injection    PREOPERATIVE DIAGNOSIS: dysphonia, laryngeal granuloma    POSTOPERATIVE DIAGNOSIS: Same    PROCEDURE: Transnasal videolaryngoscopy/bronchoscopy with decadron injection of the left vocal fold    ANESTHESIA:  Topical    COMPLICATIONS:  None    SPECIMENS:  None    PROCEDURE IN DETAIL:  The risks, benefits, limitations, and alternatives of the procedure were described to the patient in detail.  The patient understood and wished to proceed.  The appropriate consents were obtained.    The patient was placed in the upright position in the procedure chair.  The nasal cavity was topically decongested anesthetized.  The distal chip video laryngoscope was passed through the nasal cavity.  The nasal cavity and nasopharynx were within normal limits except noted below. The following findings on laryngoscopy were noted:         Tongue Base: no masses or lesions           Left vocal fold mobility: mobile       Right vocal fold mobility: mobile       Laryngeal muscle tension: lateral       Symmetry: asymmetric       Vocal fold free edge: erythema improved       Trachea: subglottis patent       Other abnormalities: persistent granuloma left false fold, improved    Following the diagnostic laryngoscopy, we proceeded with the vocal fold injection.  Video laryngoscopy was then performed with a channeled bronchoscope.  4 cc of 4% lidocaine was instilled over the vocal folds and into the trachea to provide laryngotracheal anesthesia.  Under endoscopic visualization, 1 cc of decadron 4mg/mL was injected into the left vocal fold and the granuloma along the left false fold    The patient tolerated the procedure well. There were no immediate complications.    7/8/25      6/10/25      5/20/25      4/22/25      3/24/25      Original lesion        ----------------------------------------------------------------------  Julia CLAY  MD Omer, MAEd    [facility]  Department of Otolaryngology - Head and Neck Surgery  [facility]    The total time I spent in care of this patient today (excluding time spent on other billable services) is as follows:    Time Spent  Prep time on day of patient encounter: 5 minutes  Time spent directly with patient, family or caregiver: 15 minutes  Additional Time Spent on Patient Care Activities: 5 minutes  Documentation Time: 5 minutes  Other Time Spent: 0 minutes  Total: 30 minutes

## 2025-07-09 ENCOUNTER — APPOINTMENT (OUTPATIENT)
Dept: PRIMARY CARE | Facility: CLINIC | Age: 70
End: 2025-07-09
Payer: MEDICARE

## 2025-07-09 ENCOUNTER — TREATMENT (OUTPATIENT)
Dept: SPEECH THERAPY | Facility: CLINIC | Age: 70
End: 2025-07-09
Payer: MEDICARE

## 2025-07-09 VITALS
WEIGHT: 197 LBS | RESPIRATION RATE: 14 BRPM | DIASTOLIC BLOOD PRESSURE: 90 MMHG | OXYGEN SATURATION: 98 % | BODY MASS INDEX: 27.58 KG/M2 | SYSTOLIC BLOOD PRESSURE: 150 MMHG | HEIGHT: 71 IN | HEART RATE: 68 BPM

## 2025-07-09 DIAGNOSIS — J38.2 VOCAL CORD NODULE: ICD-10-CM

## 2025-07-09 DIAGNOSIS — M47.9 DEGENERATIVE JOINT DISEASE OF LOW BACK: Primary | ICD-10-CM

## 2025-07-09 DIAGNOSIS — R49.0 MUSCLE TENSION DYSPHONIA: ICD-10-CM

## 2025-07-09 DIAGNOSIS — M48.10 DISH (DIFFUSE IDIOPATHIC SKELETAL HYPEROSTOSIS): ICD-10-CM

## 2025-07-09 DIAGNOSIS — I10 HYPERTENSION, UNSPECIFIED TYPE: ICD-10-CM

## 2025-07-09 DIAGNOSIS — J38.2 VOCAL CORD NODULE: Primary | ICD-10-CM

## 2025-07-09 PROCEDURE — 3077F SYST BP >= 140 MM HG: CPT | Performed by: INTERNAL MEDICINE

## 2025-07-09 PROCEDURE — 1123F ACP DISCUSS/DSCN MKR DOCD: CPT | Performed by: INTERNAL MEDICINE

## 2025-07-09 PROCEDURE — 1159F MED LIST DOCD IN RCRD: CPT | Performed by: INTERNAL MEDICINE

## 2025-07-09 PROCEDURE — 99213 OFFICE O/P EST LOW 20 MIN: CPT | Performed by: INTERNAL MEDICINE

## 2025-07-09 PROCEDURE — 92507 TX SP LANG VOICE COMM INDIV: CPT | Mod: GN

## 2025-07-09 PROCEDURE — 3080F DIAST BP >= 90 MM HG: CPT | Performed by: INTERNAL MEDICINE

## 2025-07-09 PROCEDURE — 3008F BODY MASS INDEX DOCD: CPT | Performed by: INTERNAL MEDICINE

## 2025-07-09 ASSESSMENT — ENCOUNTER SYMPTOMS
CHILLS: 0
VOICE CHANGE: 1
MYALGIAS: 0
JOINT SWELLING: 0
NAUSEA: 0
SHORTNESS OF BREATH: 0
DIARRHEA: 0
DIAPHORESIS: 0
FEVER: 0
VOMITING: 0
COUGH: 0
CONSTIPATION: 0

## 2025-07-09 NOTE — PROGRESS NOTES
"Subjective   Gen Govea \"Haroldo\" is a 69 y.o. male who presents for FOLLOW UP   VOICE COMES AND GOES HAD INJECTIONS YESTERDAY       HPI   GOT INJECTION IN VOCAL CORD TO HELP DYSPHONIA    WILL CONTINUE SPEECH THERAPY    BACK IS KILLING HIM.      TAKE AN IBUOPROFEN IN THE MORNING FOR BACK PAIN AND STIFFNESS    CHRONIC BACK PAINS.  BEEN GOING ON FOR AWHILE MONTHS    ANXIETY IS BETTER NOW, UNDER CONTROL, TAKE BUSPAR AT NIGHT  Review of Systems   Constitutional:  Negative for chills, diaphoresis and fever.   HENT:  Positive for voice change.    Respiratory:  Negative for cough and shortness of breath.    Cardiovascular:  Negative for leg swelling.   Gastrointestinal:  Negative for constipation, diarrhea, nausea and vomiting.   Musculoskeletal:  Negative for joint swelling and myalgias.       Objective   /90   Pulse 68   Resp 14   Ht 1.803 m (5' 11\")   Wt 89.4 kg (197 lb)   SpO2 98%   BMI 27.48 kg/m²     Physical Exam  Vitals reviewed.   Constitutional:       General: He is not in acute distress.     Appearance: He is not ill-appearing.   HENT:      Mouth/Throat:      Comments: WHISPERING/DYSPHONIA  Neck:      Vascular: No carotid bruit.   Cardiovascular:      Rate and Rhythm: Normal rate and regular rhythm.      Pulses: Normal pulses.      Heart sounds:      No gallop.   Pulmonary:      Breath sounds: Normal breath sounds. No wheezing, rhonchi or rales.   Abdominal:      General: Abdomen is flat. Bowel sounds are normal.      Palpations: Abdomen is soft.      Tenderness: There is no guarding or rebound.   Musculoskeletal:      Right lower leg: No edema.      Left lower leg: No edema.   Lymphadenopathy:      Cervical: No cervical adenopathy.   Neurological:      General: No focal deficit present.      Mental Status: He is oriented to person, place, and time.   Psychiatric:         Mood and Affect: Mood normal.         Behavior: Behavior normal.         Thought Content: Thought content normal. "         Assessment/Plan   Problem List Items Addressed This Visit       Hypertension    Vocal cord nodule     Other Visit Diagnoses         Degenerative joint disease of low back    -  Primary    Relevant Orders    Referral to Physical Therapy      DISH (diffuse idiopathic skeletal hyperostosis)        Relevant Orders    Referral to Physical Therapy          Patient Instructions    THE BACK XRAY SHOW MODERATE REGULAR BACK ARTHRITIS, BUT ALSO WHAT IS CALLED DISH = DIFFUSE IDIOPATHIC SKELETAL HYPEROSTOSIS.    2.  DISH IS NOT IN OUR CONTROL, SOME PEOPLE JUST PLACE CALCIUM IN THE FRONT LIGAMENTS OF THE SPINE WHICH CAN LEAD TO STIFFNESS AND ACCELERATING USUAL DEGENERATIVE ARTHRITIS OF THE REST OF THE SPINE    3.  RECOMMEND ONGOING AS NEEDED IBUPROFEN OR ALEVE WITH FOOD, AND PHYSICAL THERAPY FOR STRENGTHENING AND MOBILITY OF THE SPINE.  PT FREQUENTLY HELPS THE PAIN FROM THIS CONDITION QUITE A BIT    4.  IF PT CAN'T HELP YOU, THEN I'LL SEND YOU TO A SPINE PAIN DOCTOR FOR OTHER OPTIONS    5.  PLEASE CALL IF REFILLS ARE NEEDED.      6.  FOLLOW UP ROUTINE 6 MONTHS OR AS NEEDED

## 2025-07-09 NOTE — PATIENT INSTRUCTIONS
THE BACK XRAY SHOW MODERATE REGULAR BACK ARTHRITIS, BUT ALSO WHAT IS CALLED DISH = DIFFUSE IDIOPATHIC SKELETAL HYPEROSTOSIS.    2.  DISH IS NOT IN OUR CONTROL, SOME PEOPLE JUST PLACE CALCIUM IN THE FRONT LIGAMENTS OF THE SPINE WHICH CAN LEAD TO STIFFNESS AND ACCELERATING USUAL DEGENERATIVE ARTHRITIS OF THE REST OF THE SPINE    3.  RECOMMEND ONGOING AS NEEDED IBUPROFEN OR ALEVE WITH FOOD, AND PHYSICAL THERAPY FOR STRENGTHENING AND MOBILITY OF THE SPINE.  PT FREQUENTLY HELPS THE PAIN FROM THIS CONDITION QUITE A BIT    4.  IF PT CAN'T HELP YOU, THEN I'LL SEND YOU TO A SPINE PAIN DOCTOR FOR OTHER OPTIONS    5.  PLEASE CALL IF REFILLS ARE NEEDED.      6.  FOLLOW UP ROUTINE 6 MONTHS OR AS NEEDED

## 2025-07-09 NOTE — PROGRESS NOTES
"Speech-Language Pathology    Outpatient Speech-Language Pathology Treatment      Patient Name: Gen Govea \"Haroldo\"  MRN: [id number]  : 1955  Today's Date: 07/10/25  Time Calculation  Start Time: 1115  Stop Time: 1200  Time Calculation (min): 45 min    Current Problem:   Vocal nodule  Muscle tension dysphonia    SLP Assessment:  Patient came into the session today and stated that he had a vocal cord injection completed yesterday by Dr. Julia Tello, ENT.  He was unclear as to if he had vocal rest instructions.  A revised treatment session was completed today due to unknown restrictions on vocalization.  Patient was given a revised myofascial release technique today with avoidance of anterior neck manipulation due to unknown restrictions.  Contacted Dr. Tello via epic chat during treatment session to confirm if patient had vocalization restrictions.  Focus today remained on breast support.  Patient was able to complete straw breathing techniques x 5 repetitions.  He was introduced to the Breather device and was given instructions on completing the breather device technique at home 2-3 times per day.  Patient was able to tolerate the breather being set at 3 for both inhalation and exhalation.  He reported that his voice quality has been worse yesterday and today post his procedure with Dr. Tello.  Dr. Antonio contacted speech therapist via [email or web address] chat after patient had left from his treatment session and stated that he does not have vocalization restrictions.  Will continue to focus on breathing techniques, myofascial release techniques and stretching techniques during his next treatment session.  Vocal warm up techniques will also be used next week during his treatment session due to no vocalization restrictions.  Continue with plan.    Voice Plan of Care:  Frequency: 1 time per week  Duration: 12 Weeks  Number of Visits: 12  Skilled SLP warranted for voice treatment due to patient current vocal deficits, need for " education and completion of vocal techniques to improve. Without skilled intervention, patient will not improve.   Recommendations for therapeutic interventions: Speech/Voice exercises  Prognosis: Good  Discussed Plan of Care: Patient  Patient/Caregiver Demonstrated Understanding: yes  Risk and Benefits Discussed with Patient/Caregiver/Other: yes     Insurance:  Reviewed: Yes  Certification Period Start Date: 05/27/2025  Certification Period End Date: 08/25/2025  Total Number of Visits : 3  Date of Onset: 05/20/2025    Pain Assessment:  Pain Assessment: 0-10  Pain Score: 0 - No pain    Subjective:  Patient was seen today for a 40-minute voice treatment session.  This is the first treatment session since his initial eval was completed on 05/27/2025.    Goals: Goals established on 05/27/2025.-Anticipated mastery by discharge.  1.  Pt will be able to tolerate manual therapy techniques to upper back and neck musculature.      2.  Pt will be able to complete breath support techniques with 90% accuracy.      3.  Pt will be able to complete vocal warmup techniques with 90% accuracy.      4.  Pt will be able to complete pitch change techniques with 90% accuracy.      5.  Pt will be able to complete a home program 2-3 times per day.      Outpatient Education:    The Breather    Set Inhale at #3 and Exhale at #3.     Inhale for 3-5 seconds    Hold 1-2 seconds    Exhale for 3-5 seconds    Hold for 1-2 seconds.    Repeat 10 times.        *Compete 2 times per day.    *Increase # setting by 1 when inhale and/or exhale becomes easy.    Bring breather with you to all therapy sessions.    This documentation was completed using the Dragon Dictation system. There may be spelling and/or grammatical errors that were not corrected prior to final submission.

## 2025-07-16 ENCOUNTER — TREATMENT (OUTPATIENT)
Dept: SPEECH THERAPY | Facility: CLINIC | Age: 70
End: 2025-07-16
Payer: MEDICARE

## 2025-07-16 DIAGNOSIS — J38.2 VOCAL CORD NODULE: Primary | ICD-10-CM

## 2025-07-16 DIAGNOSIS — R49.0 MUSCLE TENSION DYSPHONIA: ICD-10-CM

## 2025-07-16 PROCEDURE — 92507 TX SP LANG VOICE COMM INDIV: CPT | Mod: GN

## 2025-07-16 NOTE — PROGRESS NOTES
"Speech-Language Pathology    Outpatient Speech-Language Pathology Treatment      Patient Name: Gen Govea \"Haroldo\"  MRN: [id number]  : 1955  Today's Date: 25  Time Calculation  Start Time: 1115  Stop Time: 1200  Time Calculation (min): 45 min    Current Problem:   Vocal nodule  Muscle tension dysphonia    SLP Assessment:  Patient came into the session today with significantly improved vocalization during conversational speech.  His voice was hoarse however he did have vibration of the vocal cords.  During patient's last session he was aphonic throughout the session and unable to achieve vocalization.  Received confirmation from Dr. Julia Tello, ENT.  That patient does not have voice restrictions.  Had questions regarding the breather.  He was only using the breather for exhalation phase.  Educated patient regarding use of breather for both exhalation and inhalation.  Patient was able to perform both inhalation and exhalation at a level 4 on the breather device.  He was able to tolerate myofascial release techniques and was coached on stretching techniques which she was able to complete without difficulty.  He was able to complete vocal warm ups with inconsistent voice quality.  On several occasions patient had what appeared to be normal voice while completing the vocal warm ups.  However, patient was not able to maintain this good vocal quality throughout the vocal warm-ups.  Patient had significant improvement with vocal warm up techniques since the start of care.  Introduced the concept of functional phrases to patient and encouraged him to start thinking about functional phrases he uses every day at home and he will be asked to help formulate functional phrases next week for carryover of skills learned in therapy isolation setting to functional phrases.  Continue with plan.    Voice Plan of Care:  Frequency: 1 time per week  Duration: 12 Weeks  Number of Visits: 12  Skilled SLP warranted for voice " treatment due to patient current vocal deficits, need for education and completion of vocal techniques to improve. Without skilled intervention, patient will not improve.   Recommendations for therapeutic interventions: Speech/Voice exercises  Prognosis: Good  Discussed Plan of Care: Patient  Patient/Caregiver Demonstrated Understanding: yes  Risk and Benefits Discussed with Patient/Caregiver/Other: yes     Insurance:  Reviewed: Yes  Certification Period Start Date: 05/27/2025  Certification Period End Date: 08/25/2025  Total Number of Visits : 4  Date of Onset: 05/20/2025    Pain Assessment:  Pain Assessment: 0-10  Pain Score: 0 - No pain    Subjective:  Patient was seen today for a 40-minute voice treatment session.  This is the first treatment session since his initial eval was completed on 05/27/2025.    Goals: Goals established on 05/27/2025.-Anticipated mastery by discharge.  1.  Pt will be able to tolerate manual therapy techniques to upper back and neck musculature.      2.  Pt will be able to complete breath support techniques with 90% accuracy.      3.  Pt will be able to complete vocal warmup techniques with 90% accuracy.      4.  Pt will be able to complete pitch change techniques with 90% accuracy.      5.  Pt will be able to complete a home program 2-3 times per day.      Outpatient Education:    The Breather    Set Inhale at #4 and Exhale at #4.     Inhale for 3-5 seconds    Hold 1-2 seconds    Exhale for 3-5 seconds    Hold for 1-2 seconds.    Repeat 10 times.        *Compete 3-5 times per day.    *Increase # setting by 1 when inhale and/or exhale becomes easy.    Bring breather with you to all therapy sessions.    This documentation was completed using the Dragon Dictation system. There may be spelling and/or grammatical errors that were not corrected prior to final submission.

## 2025-07-17 ENCOUNTER — EVALUATION (OUTPATIENT)
Dept: PHYSICAL THERAPY | Facility: CLINIC | Age: 70
End: 2025-07-17
Payer: MEDICARE

## 2025-07-17 DIAGNOSIS — M54.50 CHRONIC MIDLINE LOW BACK PAIN, UNSPECIFIED WHETHER SCIATICA PRESENT: Primary | ICD-10-CM

## 2025-07-17 DIAGNOSIS — G89.29 CHRONIC MIDLINE LOW BACK PAIN, UNSPECIFIED WHETHER SCIATICA PRESENT: Primary | ICD-10-CM

## 2025-07-17 PROCEDURE — 97161 PT EVAL LOW COMPLEX 20 MIN: CPT | Mod: GP | Performed by: PHYSICAL THERAPIST

## 2025-07-17 PROCEDURE — 97110 THERAPEUTIC EXERCISES: CPT | Mod: GP | Performed by: PHYSICAL THERAPIST

## 2025-07-17 ASSESSMENT — PAIN - FUNCTIONAL ASSESSMENT: PAIN_FUNCTIONAL_ASSESSMENT: 0-10

## 2025-07-17 ASSESSMENT — ENCOUNTER SYMPTOMS
DEPRESSION: 0
LOSS OF SENSATION IN FEET: 1
OCCASIONAL FEELINGS OF UNSTEADINESS: 1

## 2025-07-17 ASSESSMENT — PAIN SCALES - GENERAL: PAINLEVEL_OUTOF10: 1

## 2025-07-17 NOTE — PROGRESS NOTES
Physical Therapy Evaluation    Patient Name: Gen Govea  MRN: [id number]  Today's Date: 7/17/2025  Time Calculation  Start Time: 0930  Stop Time: 1012  Time Calculation (min): 42 min  PT Evaluation Time Entry  PT Evaluation (Low) Time Entry: 25  PT Therapeutic Procedures Time Entry  Therapeutic Exercise Time Entry: 10  Therapeutic Activity Time Entry: 7                   Current Problem  1. Chronic midline low back pain, unspecified whether sciatica present  Follow Up In Physical Therapy        Insurance    Insurance reviewed   Visit number: 1  Approved number of visits: BMN      [employer] Medicare BMN Copay 40 No Auth  Subjective   General:  Patient is a 69 year old  presenting with complaints of back pain. 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 This has been going on since about 2 months where it has been severe. He has had some back issues before but it was tolerable. He denies any inciting incident. The pain will start in his lower back on both sides and radiating to his hips and into his right thigh and sometimes his foot. This is primarily when the pain is more aggravated. Aggravating activities include prolonged turning activities like when he is working on cars. It will be very stiff in the lower back. Standing is bothersome is worse. Walking makes things feel better. Sitting is OK. Patient reports that he takes motrin every morning. He has a hard time straightening up in the morning. Patient will report fairly consistent mild numbness and tingling into his feet. He is not sure how long this has been going on. Patient denies any acute bowel or bladder changes and no saddle paresthesia. The patient's goals for therapy are to improve discomfort.  Patient reports a recent history of throat surgery and is in speech therapy. Patient reports a history of throat cancer. He is not under current cancer treatment. Patient denies any history of heart attack or stroke. Patient likes to work on cars. Patient reports  that currently he has not pain or numbness/tingling into his legs or feet.   Precautions:  None  Pain:  8/10 at worst  1/10 at rest  Reviewed medical screening form with pt and medical screening assessed    Imaging:   Narrative & Impression   Interpreted By:  Ken Palumbo,   STUDY:  XR LUMBAR SPINE COMPLETE 4+ VIEWS      INDICATION:  Signs/Symptoms:RIGHT LUMBAR RADICULOPATHY.      COMPARISON:  None      ACCESSION NUMBER(S):  [id number]      ORDERING CLINICIAN:  LASHAUN BAEZA     Objective   IMPRESSION:      Moderate diffuse lumbar degenerative changes at all levels.      Bridging ossification lower thoracic spine with DISH.      Signed by: Ken Palumbo 4/14/2025 6:27 PM    Observation: Unremarkable  Screening:      [place] Screen  -Dermatomes: L1-S2 symmetrical bilaterally, did not report numbness in feet  -Myotomes:L1-S2 symmetrical bilaterally  -Patellar Reflex: 0/4 bilaterally  -Achilles Tendon Reflex: 0/4 bilaterally  -Passive SLR: R- 55  degrees, posterior hip/back tightness L- 55   degrees, posterior hip tightness  -Clonus: R- negative  L- negative      Transfers: Normal  Gait: Normal    Lumbar ROM (* indicates pain)  Flex:  50 % full range  Ext:  25  % full range  Sidebending: R-  25 % full range  L-  25 % full range, right sided tightness  Rotation: R- 25  % full range L-  50 % full range      Repeated loaded flexion: ERP, better  Repeated loaded ext: ERP, slightly worse  Repeated Seated Flexion: No change  NOHEMY: Initial Pain, worse      Supine Hip PROM (* indicates pain)  Flexion: L 120  degrees ; R  120  degrees  IR: L 15  degrees; R 10   degrees  ER: L 45  degrees; R:  45 degrees    Hip MMT and Muscular Performance (* indicates pain)  Flex: L  5/5 ; R  5/5  Ext: L   4-/5; R  4-/5  Hip Bridge: 75% full hip extension        Accessory Joint mobility  -2/6 mobility L1-S1, concordant discomfort at L4/5, L5/S1  -1/6 mobility T4-T12         Palpation: Unremarkable      Outcome Measures:  Other  Measures  Oswestry Disablity Index (KAITY): 8     Treatment:   -Patient education regarding recovery timeline, rehabilitation process and rehabilitation timeline, home exercise program demonstration and construction, review of precautions, and long term strategies to maximize functional capacity and functional independence, safety netting education regarding monitoring for any of the following: development of saddle parethesia, development of acute bowel or bladder changes, development of acute bilateral numbness/tingling into lower extremities and/or development of progressive bilateral lower extremity weakness 7 minutes  -Prone on elbows 2 minutes pain  -LTR 1x15  -SKTC 1x10  -Seated Trunk flexion 2x5  -Hip bridg 1x10  EDUCATION/HEP:  Access Code: [email or web address]  URL: [email or web address]  Date: 07/17/2025  Prepared by: Camacho Powers    Exercises  - Supine Lower Trunk Rotation  - 2 x daily - 7 x weekly - 2 sets - 10-20 reps  - Hooklying Single Knee to Chest Stretch  - 2 x daily - 7 x weekly - 2 sets - 10 reps  - Supine Bridge  - 1 x daily - 7 x weekly - 3 sets - 10 reps  - Seated Flexion Stretch  - 5 x daily - 7 x weekly - 5 reps  Assessment:  Patient is a 69 year old  presenting with lower back pain. Patient presents with the following primary physical and functional impairments and limitations:  limitations and discomfort with AROM lumbar spine flexion, extension, bilateral lateral flexion and rotation, limited bilateral hip extension AROM and strength, generalized thoracolumbar joint hypomobility, subjective reports of severe morning stiffness and impaired ADL capacity secondary to the abovementioned impairments.  Patient tolerated today's evaluation and treatment performed well. Patient is displaying good prognosis for physical therapy care based upon subjective and objective assessment. Patient will benefit from skilled intervention to address the above mentioned impairments to maximize  functional capacity and quality of life. Patient appeared to understand all education provided.  No adverse reactions were observed or reported from patient at conclusion of today's session.              Clinical Presentation: Stable   Level of Complexity: Low   Goals:  Pt will report at least 75% improvement in  pain during everyday activities.  Pt will demo full and symmetrical AROM of lumbar spine without pain.  Pt will improve Oswestry by at least 10 points (MCID) to reflect improvement in ADLs/pain reduction.   Pt will demonstrate independence and report compliance with HEP.  Pt. Will report return to waking up in the morning with minimal pain/limitation, indicating improved desired functional capacity   Pt. Will report at least 50% improvement in discomfort when working on cars in his shop    Plan  1x/week for 6 visits     Skilled therapeutic intervention to address the above mentioned physical and functional impairments and limitations including, but not limited to: patient education, therapeutic exercise, therapeutic activity, manual therapy, body mechanics training, dry needling, blood flow restriction training, instrumented soft tissue mobilization, manual soft tissue mobilization, gait retraining, biofeedback, cryotherapy, electrical stimulation, home program development, hot pack, taping, neuromuscular re-education, self-care/home management, and vasopneumatic compression.

## 2025-07-23 ENCOUNTER — APPOINTMENT (OUTPATIENT)
Dept: PRIMARY CARE | Facility: CLINIC | Age: 70
End: 2025-07-23
Payer: MEDICARE

## 2025-07-23 ENCOUNTER — TREATMENT (OUTPATIENT)
Dept: PHYSICAL THERAPY | Facility: CLINIC | Age: 70
End: 2025-07-23
Payer: MEDICARE

## 2025-07-23 ENCOUNTER — APPOINTMENT (OUTPATIENT)
Dept: SPEECH THERAPY | Facility: CLINIC | Age: 70
End: 2025-07-23
Payer: MEDICARE

## 2025-07-23 DIAGNOSIS — G89.29 CHRONIC MIDLINE LOW BACK PAIN, UNSPECIFIED WHETHER SCIATICA PRESENT: Primary | ICD-10-CM

## 2025-07-23 DIAGNOSIS — M54.50 CHRONIC MIDLINE LOW BACK PAIN, UNSPECIFIED WHETHER SCIATICA PRESENT: Primary | ICD-10-CM

## 2025-07-23 PROCEDURE — 97110 THERAPEUTIC EXERCISES: CPT | Mod: GP,CQ

## 2025-07-23 NOTE — PROGRESS NOTES
"Physical Therapy Treatment    Patient Name: Gen Govea  MRN: [id number]  Today's Date: 7/23/2025  Time Calculation  Start Time: 0830  Stop Time: 0904  Time Calculation (min): 34 min  PT Therapeutic Procedures Time Entry  Therapeutic Exercise Time Entry: 30       Insurance reviewed   Visit number: 2/6  Approved number of visits: BMN       [employer] Medicare BMN Copay 40 No Auth    Current Problem  1. Chronic midline low back pain, unspecified whether sciatica present            Subjective   General   Pt. Reports he has some pain still coming in that he is just trying to manage.   Precautions     Pain       Objective   Treatments:   -LTR 5\"x15ea  -SKTC 10\"x10ea  -DKTC w/ PB 5\"x20  -Hip bridge 2x10  -Supine Add/Abd w/ PPT RTB 3\"x20  -Clams RTB 3\"x20  -SLRs F 2x10 B  -STS 2x10    -Seated Trunk flexion 2x5  -Stand hip 3-way RTB w/ PB pushdown 2x10ea    Assessment:   Pt.  Progressing w/ all exercises well. Added clams, supine abd/add, SLRs, STS, and stand hip 3-way for improving strength/endurance for performing ADLs. Added DKTC, reps to SKTC, and reps to LTR for improving mobility w/ good pt. Tolerance. Pt. Stated he felt ok after treatment today. Pt. Given updated HEP w/ RTB.     Plan:   Continue to increase strength/mobility for performing ADLs.     OP EDUCATION:   Access Code: [email or web address]    Goals:     "

## 2025-07-30 ENCOUNTER — TREATMENT (OUTPATIENT)
Dept: SPEECH THERAPY | Facility: CLINIC | Age: 70
End: 2025-07-30
Payer: MEDICARE

## 2025-07-30 ENCOUNTER — APPOINTMENT (OUTPATIENT)
Dept: PHYSICAL THERAPY | Facility: CLINIC | Age: 70
End: 2025-07-30
Payer: MEDICARE

## 2025-07-30 DIAGNOSIS — R49.0 MUSCLE TENSION DYSPHONIA: ICD-10-CM

## 2025-07-30 DIAGNOSIS — J38.2 VOCAL CORD NODULE: Primary | ICD-10-CM

## 2025-07-30 PROCEDURE — 92507 TX SP LANG VOICE COMM INDIV: CPT | Mod: GN

## 2025-07-30 NOTE — PROGRESS NOTES
"Speech-Language Pathology    Outpatient Speech-Language Pathology Treatment      Patient Name: Gen Govea \"Haroldo\"  MRN: [id number]  : 1955  Today's Date: 25  Time Calculation  Start Time: 905  Stop Time: 945  Time Calculation (min): 40 min    Current Problem:   Vocal nodule  Muscle tension dysphonia    SLP Assessment:  Patient came into the session today and indicated that he has continued to have improved vocalization but is frustrated with his harsh vocal quality.  Indicated to patient that he has made significant progress since the start of care.  A video of patient reading the [place] passage was shared with the patient from his initial evaluation.  At the end of today's session a new video of the [place] passage was recorded and patient had significant improvement from a whisper voice with aphonic production during the evaluation to the current harsh vocal quality with voicing.  Today was patient's last scheduled treatment session of 4 sessions.  It was recommended that he schedule for additional treatment sessions however patient will be seen every other week.  During the treatment session today patient was able to tolerate myofascial release and complete stretching techniques well.  He was able to demonstrate improved cervical range of motion for head turns, head tilts and head nods.  Patient still has mild to moderate decreased range of motion for head tilts but is able to complete head turns with good range of motion.  He was able to complete vocal warm ups with voicing for each /m/ plus vowel sounds and /h/ plus vowel sounds but had a harsh/hoarse vocal quality.  Again, patient was educated regarding the progress he has made since the start of care.  Patient stated he is happy with his progress and also reports that he does have fatigue throughout the day when speaking.  Patient has also been using the breather device and has been using the breather set at a #3 for both inhalation and " exhalation.  He was instructed to continue to practice using breather at home 2-3 times per day.  Functional phrases were also formulated with patient.  See below in education section for details.  He was encouraged to practice his functional phrases 2-3 times per day at home and to work towards improving his vocal quality when producing functional phrases spontaneously.  Patient was scheduled for 4 additional treatment sessions.  Continue with plan.    Voice Plan of Care:  Frequency: 1 time per week  Duration: 12 Weeks  Number of Visits: 12  Skilled SLP warranted for voice treatment due to patient current vocal deficits, need for education and completion of vocal techniques to improve. Without skilled intervention, patient will not improve.   Recommendations for therapeutic interventions: Speech/Voice exercises  Prognosis: Good  Discussed Plan of Care: Patient  Patient/Caregiver Demonstrated Understanding: yes  Risk and Benefits Discussed with Patient/Caregiver/Other: yes     Insurance:  Reviewed: Yes  Certification Period Start Date: 05/27/2025  Certification Period End Date: 08/25/2025  Total Number of Visits : 5  Date of Onset: 05/20/2025    Pain Assessment:  Pain Assessment: 0-10  Pain Score: 0 - No pain    Subjective:  Patient was seen today for a 40-minute voice treatment session.      Goals: Goals established on 05/27/2025.-Anticipated mastery by discharge.  1.  Pt will be able to tolerate manual therapy techniques to upper back and neck musculature.      2.  Pt will be able to complete breath support techniques with 90% accuracy.      3.  Pt will be able to complete vocal warmup techniques with 90% accuracy.      4.  Pt will be able to complete pitch change techniques with 90% accuracy.      5.  Pt will be able to complete a home program 2-3 times per day.      Outpatient Education:    Functional Phrases     Good morning  Hi dear, how was your day?  How was your day at work?  What's for dinner?  Do you want  to go camping this weekend?  Do you want to go out on the boat?  Do you want to go shopping?  Do you want to go on a golf cart ride?    This documentation was completed using the Dragon Dictation system. There may be spelling and/or grammatical errors that were not corrected prior to final submission.

## 2025-08-05 ENCOUNTER — OFFICE VISIT (OUTPATIENT)
Dept: OTOLARYNGOLOGY | Facility: CLINIC | Age: 70
End: 2025-08-05
Payer: MEDICARE

## 2025-08-05 VITALS
BODY MASS INDEX: 26.84 KG/M2 | TEMPERATURE: 97 F | SYSTOLIC BLOOD PRESSURE: 167 MMHG | HEIGHT: 72 IN | DIASTOLIC BLOOD PRESSURE: 89 MMHG | WEIGHT: 198.19 LBS

## 2025-08-05 DIAGNOSIS — J38.2 VOCAL CORD NODULE: ICD-10-CM

## 2025-08-05 DIAGNOSIS — R49.0 DYSPHONIA: Primary | ICD-10-CM

## 2025-08-05 DIAGNOSIS — C32.9 MALIGNANT NEOPLASM OF LARYNX: ICD-10-CM

## 2025-08-05 DIAGNOSIS — R49.0 MUSCLE TENSION DYSPHONIA: ICD-10-CM

## 2025-08-05 PROCEDURE — 31579 LARYNGOSCOPY TELESCOPIC: CPT | Performed by: OTOLARYNGOLOGY

## 2025-08-05 PROCEDURE — 3077F SYST BP >= 140 MM HG: CPT | Performed by: OTOLARYNGOLOGY

## 2025-08-05 PROCEDURE — 1160F RVW MEDS BY RX/DR IN RCRD: CPT | Performed by: OTOLARYNGOLOGY

## 2025-08-05 PROCEDURE — 1159F MED LIST DOCD IN RCRD: CPT | Performed by: OTOLARYNGOLOGY

## 2025-08-05 PROCEDURE — 3008F BODY MASS INDEX DOCD: CPT | Performed by: OTOLARYNGOLOGY

## 2025-08-05 PROCEDURE — 99214 OFFICE O/P EST MOD 30 MIN: CPT | Performed by: OTOLARYNGOLOGY

## 2025-08-05 PROCEDURE — 3079F DIAST BP 80-89 MM HG: CPT | Performed by: OTOLARYNGOLOGY

## 2025-08-05 PROCEDURE — 99214 OFFICE O/P EST MOD 30 MIN: CPT | Mod: 25 | Performed by: OTOLARYNGOLOGY

## 2025-08-05 NOTE — PROGRESS NOTES
"Patient: Gen Govea   MRN: [id number] YOB: 1955   Sex: male Age: 69 y.o.  Date of Service: 2025       ASSESSMENT AND PLAN  I discussed the findings with Gen Govea \"Haroldo\" and have recommended the followin. 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 s/p MDL with CO2 laser supraglottoplasty and excision of bilateral VF lesions 25. His voice has been slow to recover; s/p in office VF injection with decadron 6/10/25, 25  - Follow-up 6 weeks for repeat strobe and possible repeat injection  - Continue voice therapy for secondary muscle tension      CHIEF COMPLAINT  Dysphonia    HISTORY OF PRESENT ILLNESS  Gen Govea \"Haroldo\" is a 69 y.o. male who we have been following for dysphonia and right vocal fold SCC, s/p MDL with CO2 laser supraglottoplasty and excision of bilateral VF lesions 25.    25  He has been working with Levi Smith for voice therapy, making progress.    25   Notes some episodes of a more normal voice. Seeing him back Weds    6/10/25  He saw Levi Smith for voice evaluation 25.     25  Here for follow-up. Voice unchanged and he gets pain in his throat if he talks too much.     25  Still with poor voice quality. Some pain in the right jaw. Otherwise no issues. He completed a course of steroids    3/25/25  He is s/p MDL with CO2 laser supraglottoplasty and excision of bilateral VF lesions 25. 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 He reports overall doing well. His voice has been slow to come back.     25  The patient reports voice issues since last summer, worse in the morning. Some irritation if he talks a lot. 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 Denies weight loss, no hemoptysis.    PMH: HTN, HLD  Meds: he is on a blood thinner although it is not listed in his med list, he is not sure the name  SH: quit smoking 35-40 years ago, previously 1ppd, used to drink 2-3 drinks a week but has " since quit because it bothered his throat    ADDITIONAL HISTORY  Past Medical History  He has a past medical history of Anxiety, Coronary artery disease, Disease of thyroid gland, Hyperlipidemia, Hypertension, and Voice disorder. Surgical History  He has a past surgical history that includes Colonoscopy; Cardiac catheterization (N/A, 05/28/2024); and Larynx surgery.   Social History  He reports that he quit smoking about 40 years ago. His smoking use included cigarettes. He has never used smokeless tobacco. He reports that he does not currently use alcohol after a past usage of about 3.0 standard drinks of alcohol per week. He reports that he does not currently use drugs. Allergies  Patient has no known allergies.     Family History  Family History   Problem Relation Name Age of Onset    Alcohol abuse Mother      Cirrhosis Mother      Pancreatitis Father          REVIEW OF SYSTEMS  All 10 systems were reviewed and negative except for above.      PHYSICAL EXAM  ENT Physical Exam   GENERAL: Well-nourished and developed, alert and appropriate, no distress, voice [id number]  RESPIRATORY: Breathing quietly, no stridor  HEAD: Normocephalic atraumatic  FACE: Symmetric, no masses or lesions  EYES:  Pupils reactive, sclera clear, external ocular muscles intact, no nystagmus.    EARS:  Pinnae normal.   NOSE:  No anterior lesions, masses or polyps.  ORAL CAVITY/OROPHARYNX:  Buccal mucosa is moist without lesions or masses, tongue midline and palate elevates symmetrically. Tongue mobility intact.  NECK:  Soft. There is no abnormal lymphadenopathy or thyromegaly.    NEUROLOGIC:  Cranial nerves II-XII grossly intact.       Last Recorded Vitals  There were no vitals taken for this visit.    RESULTS    Patient Reported Outcome Measures  N/A    Laboratory, Radiology, and Pathology  I personally reviewed the following results, with the following interpretation:   Path 3/25/25  FINAL DIAGNOSIS   A.  False vocal cord, right,  excision:  - Respiratory mucosa with squamous metaplasia and underlying seromucinous glands, negative for high-grade dysplasia and carcinoma.     B.  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     C.  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     D.  Larynx, right infraglottis, excision:  - High-grade dysplasia, negative for invasive carcinoma.     E.  Ventricle, right, excision:  - Denuded epithelium with underlying fibrovascular tissue, and seromucinous glands, see note.  - Detached fragment of dysplastic epithelium, see note.     Note: The denuded epithelium is thin, with little epithelium available for evaluation.  Dysplasia cannot be entirely ruled out.  The small detached fragment of dysplastic epithelium does not show connection to the connective tissue with multiple deeper levels examined.  Clinical correlation is recommended.     F.  False vocal cord, left, excision:  - Respiratory mucosa with squamous metaplasia and underlying seromucinous glands, negative for high-grade dysplasia and carcinoma.     G.  True vocal cord, left, excision:  - Squamous mucosa with high-grade dysplasia with focal areas concerning for superficial invasion, see note.     Note: Multiple deeper levels were examined.   There are focal areas concerning for but not diagnostic of definitive invasion.  Clinical correlation is recommended.       CT neck 2/5/25  IMPRESSION:  Mild soft tissue thickening and irregularity of bilateral true vocal  cords, likely corresponding to lesion seen on laryngoscopic exam. No  extension into the supraglottic or subglottic larynx. No cartilage  invasion or extra laryngeal extension.      No cervical lymphadenopathy.      Posterior osteophytes versus ossification of posterior longitudinal  ligament at C2-3 C3-4 resulting in at least  mild-to-moderate  narrowing of the spinal canal.    Cardiac catheterization May 2024 - normal coronaries with normal LV function.        PROCEDURES  Flexible Fiberoptic Laryngoscopy with Stroboscopy    Patient failed a mirror exam due to limitations of equipment and the need for stroboscopy to assess glottic vibration and closure.     PREOPERATIVE DIAGNOSIS: Dysphonia    POSTOPERATIVE DIAGNOSIS: Same    PROCEDURE:  Strobovideolaryngoscopy    ANESTHESIA:  Topical    COMPLICATIONS:  None    SPECIMENS:  None    PROCEDURE IN DETAIL: The patient was seated in an upright position.  The nasal cavity was topically decongested and anesthetized.  The stroboscopic microphone was held to the neck at the level of the larynx.  The distal chip video laryngoscope was passed through the nasal cavity.  The nasal cavity and nasopharynx were within normal limits except noted below.  The following findings on stroboscopy were noted:      Tongue Base: no masses or lesions   Vocal Fold Mobility               Right VF: mobile               Left VF: mobile   TVF Appearance               Edema/Erythema: mild edema               Lesions/vibratory margin irregularities: scar banding on right   Glottic Closure Pattern: complete    Vibration:               Phase: asymmetric   Periodicity: irregular               Amplitude: abnormal                Waveform: abnormal    Muscle Tension Patterns:  lateral but improved   Other Findings: resolving granuloma left false fold    The patient tolerated the procedure well.       8/5/25      7/8/25      6/10/25      5/20/25      4/22/25      3/24/25      Original lesion        ----------------------------------------------------------------------  Julia Tello MD, MAEd    [facility]  Department of Otolaryngology - Head and Neck Surgery  [facility]    The total time I spent in care of this patient today (excluding time spent on  other billable services) is as follows:    Time Spent  Prep time on day of patient encounter: 5 minutes  Time spent directly with patient, family or caregiver: 15 minutes  Additional Time Spent on Patient Care Activities: 5 minutes  Documentation Time: 5 minutes  Other Time Spent: 0 minutes  Total: 30 minutes

## 2025-08-06 ENCOUNTER — TREATMENT (OUTPATIENT)
Dept: PHYSICAL THERAPY | Facility: CLINIC | Age: 70
End: 2025-08-06
Payer: MEDICARE

## 2025-08-06 DIAGNOSIS — G89.29 CHRONIC MIDLINE LOW BACK PAIN, UNSPECIFIED WHETHER SCIATICA PRESENT: ICD-10-CM

## 2025-08-06 DIAGNOSIS — M54.50 CHRONIC MIDLINE LOW BACK PAIN, UNSPECIFIED WHETHER SCIATICA PRESENT: ICD-10-CM

## 2025-08-06 PROCEDURE — 97110 THERAPEUTIC EXERCISES: CPT | Mod: GP,CQ

## 2025-08-06 NOTE — PROGRESS NOTES
"Physical Therapy Treatment    Patient Name: Gen Govea  MRN: [id number]  Today's Date: 8/6/2025  Time Calculation  Start Time: 1136  Stop Time: 1216  Time Calculation (min): 40 min     PT Therapeutic Procedures Time Entry  Therapeutic Exercise Time Entry: 38                 Current Problem  1. Chronic midline low back pain, unspecified whether sciatica present  Follow Up In Physical Therapy          Insurance:    Precautions       Subjective   Subjective:   Pt currently has just a little pain in the B low back, especially the R side. The mornings are bad, until he does his ex. Still gets some pain shooting down the R leg  Pain   2/10    Objective   Treatments:  -supine diaphragmatic breathing 10x   -LTR 5\"x15ea  -SKTC 10\"x10ea  -DKTC w/ PB 5\"x20  -Hip bridge 2x10  -Supine Add/Abd w/ PPT RTB 3\"x20 (no add today)  -Clams RTB 3\"x20  -SLRs F 2x10 B  -STS 2x10 10#   -Seated Trunk flexion 10x  -Stand hip 3-way RTB (no flex today)  -Standing PB pushdown 5\"x20   OP EDUCATION:   Current HEP      Assessment:   Pt displays good mobility with SKTC and DKTC. He tolerated LTR well. Introduced diaphragmatic breathing with good follow through. Had some low back discomfort in back with bridges, but no leg pain. Maintained correct pelvic alignment with clamshells. Pt felt some L low back pain with sit to stands. Good posture with standing hip ex. After first couple of seated trunk flex, pt didn't have any difficulty. Introduced TB antirotations for more core strengthening. Pt had a little back pain with these.     Plan:   Cont to increase core stabilization and improve posture.               "

## 2025-08-11 ENCOUNTER — PATIENT MESSAGE (OUTPATIENT)
Dept: CARDIOLOGY | Facility: CLINIC | Age: 70
End: 2025-08-11
Payer: MEDICARE

## 2025-08-11 DIAGNOSIS — I10 PRIMARY HYPERTENSION: ICD-10-CM

## 2025-08-11 DIAGNOSIS — M54.9 CHRONIC BACK PAIN, UNSPECIFIED BACK LOCATION, UNSPECIFIED BACK PAIN LATERALITY: Primary | ICD-10-CM

## 2025-08-11 DIAGNOSIS — G89.29 CHRONIC BACK PAIN, UNSPECIFIED BACK LOCATION, UNSPECIFIED BACK PAIN LATERALITY: Primary | ICD-10-CM

## 2025-08-11 DIAGNOSIS — M48.10 DISH (DIFFUSE IDIOPATHIC SKELETAL HYPEROSTOSIS): ICD-10-CM

## 2025-08-12 RX ORDER — CARVEDILOL 3.12 MG/1
3.12 TABLET ORAL DAILY
Qty: 90 TABLET | Refills: 3 | Status: SHIPPED | OUTPATIENT
Start: 2025-08-12 | End: 2026-08-12

## 2025-08-13 ENCOUNTER — DOCUMENTATION (OUTPATIENT)
Dept: SPEECH THERAPY | Facility: CLINIC | Age: 70
End: 2025-08-13
Payer: MEDICARE

## 2025-08-14 ENCOUNTER — APPOINTMENT (OUTPATIENT)
Dept: PHYSICAL THERAPY | Facility: CLINIC | Age: 70
End: 2025-08-14
Payer: MEDICARE

## 2025-08-15 ENCOUNTER — OFFICE VISIT (OUTPATIENT)
Dept: PAIN MEDICINE | Facility: CLINIC | Age: 70
End: 2025-08-15
Payer: MEDICARE

## 2025-08-15 DIAGNOSIS — M47.816 LUMBAR SPONDYLOSIS: Primary | ICD-10-CM

## 2025-08-15 PROCEDURE — 1159F MED LIST DOCD IN RCRD: CPT | Performed by: PAIN MEDICINE

## 2025-08-15 PROCEDURE — 99212 OFFICE O/P EST SF 10 MIN: CPT

## 2025-08-15 PROCEDURE — 99204 OFFICE O/P NEW MOD 45 MIN: CPT | Performed by: PAIN MEDICINE

## 2025-08-15 PROCEDURE — G2211 COMPLEX E/M VISIT ADD ON: HCPCS | Performed by: PAIN MEDICINE

## 2025-08-15 ASSESSMENT — PAIN - FUNCTIONAL ASSESSMENT: PAIN_FUNCTIONAL_ASSESSMENT: 0-10

## 2025-08-15 ASSESSMENT — PAIN SCALES - GENERAL: PAINLEVEL_OUTOF10: 10 - WORST POSSIBLE PAIN

## 2025-08-15 ASSESSMENT — PAIN DESCRIPTION - DESCRIPTORS: DESCRIPTORS: DULL;ACHING;STABBING

## 2025-08-19 ENCOUNTER — APPOINTMENT (OUTPATIENT)
Dept: OTOLARYNGOLOGY | Facility: CLINIC | Age: 70
End: 2025-08-19
Payer: MEDICARE

## 2025-08-21 ENCOUNTER — APPOINTMENT (OUTPATIENT)
Dept: PHYSICAL THERAPY | Facility: CLINIC | Age: 70
End: 2025-08-21
Payer: MEDICARE

## 2025-08-26 ENCOUNTER — PROCEDURE VISIT (OUTPATIENT)
Dept: OTOLARYNGOLOGY | Facility: CLINIC | Age: 70
End: 2025-08-26
Payer: MEDICARE

## 2025-08-26 VITALS
BODY MASS INDEX: 26.82 KG/M2 | TEMPERATURE: 97.7 F | HEIGHT: 72 IN | SYSTOLIC BLOOD PRESSURE: 170 MMHG | WEIGHT: 198 LBS | DIASTOLIC BLOOD PRESSURE: 91 MMHG

## 2025-08-26 DIAGNOSIS — J38.2 VOCAL CORD NODULE: ICD-10-CM

## 2025-08-26 DIAGNOSIS — R49.0 DYSPHONIA: Primary | ICD-10-CM

## 2025-08-26 DIAGNOSIS — C32.9 MALIGNANT NEOPLASM OF LARYNX: ICD-10-CM

## 2025-08-26 PROCEDURE — 99214 OFFICE O/P EST MOD 30 MIN: CPT | Mod: 25 | Performed by: OTOLARYNGOLOGY

## 2025-08-26 PROCEDURE — 31579 LARYNGOSCOPY TELESCOPIC: CPT | Performed by: OTOLARYNGOLOGY

## 2025-08-26 PROCEDURE — 99214 OFFICE O/P EST MOD 30 MIN: CPT | Performed by: OTOLARYNGOLOGY

## 2025-08-27 ENCOUNTER — DOCUMENTATION (OUTPATIENT)
Dept: SPEECH THERAPY | Facility: CLINIC | Age: 70
End: 2025-08-27
Payer: MEDICARE

## 2025-08-28 ENCOUNTER — APPOINTMENT (OUTPATIENT)
Dept: PHYSICAL THERAPY | Facility: CLINIC | Age: 70
End: 2025-08-28
Payer: MEDICARE

## 2025-08-29 RX ORDER — SODIUM CHLORIDE, SODIUM LACTATE, POTASSIUM CHLORIDE, CALCIUM CHLORIDE 600; 310; 30; 20 MG/100ML; MG/100ML; MG/100ML; MG/100ML
20 INJECTION, SOLUTION INTRAVENOUS CONTINUOUS
OUTPATIENT
Start: 2025-10-03 | End: 2025-10-03

## 2025-09-22 ENCOUNTER — APPOINTMENT (OUTPATIENT)
Dept: CARDIOLOGY | Facility: CLINIC | Age: 70
End: 2025-09-22
Payer: MEDICARE

## (undated) DEVICE — CATHETER, DIAGNOSTIC, 5FR,  PIG-145, 110CM, 6SH ANGLED

## (undated) DEVICE — COVER, MAYO STAND, W/PAD, 23 IN, DISPOSABLE, PLASTIC, LF, STERILE

## (undated) DEVICE — TUBING, SUCTION, CONNECTING, STERILE 0.25 X 120 IN., LF

## (undated) DEVICE — COUNTER, NEEDLE, FOAM BLOCK, W/MAGNET, W/BLADE GUARD, 10 COUNT, RED, LF

## (undated) DEVICE — CLOSURE SYSTEM, VASCULAR, VASCADE, 5 F

## (undated) DEVICE — BANDAGE, QUIKCLOT, INTERVENTIONAL HEMO, W/O SLIT

## (undated) DEVICE — NEEDLE, INJECTION, OROTRACHEAL

## (undated) DEVICE — REST, HEAD, BAGEL, 9 IN

## (undated) DEVICE — COVER, TABLE, 44 X 75 IN, DISPOSABLE, LF, STERILE

## (undated) DEVICE — PITCHER, GRADUATE, 32 OZ (1200CC), STERILE

## (undated) DEVICE — NEEDLE, ANGIOGRAPHY, SECURELOC, 18G X 7.0CM, WITH WINGS, 0.038IN

## (undated) DEVICE — SYRINGE, 60 CC, IRRIGATION, BULB, CONTRO-BULB, PAPER POUCH

## (undated) DEVICE — MANIFOLD, 4 PORT NEPTUNE STANDARD

## (undated) DEVICE — CATHETER, DIAGNOSTIC, JUDKINS, LEFT, 5 FR-JL 4.0

## (undated) DEVICE — CATHETER, RED RUBBER 14FR

## (undated) DEVICE — Device

## (undated) DEVICE — TUBING, MANIFOLD, LOW PRESSURE

## (undated) DEVICE — COVER, CART, 45 X 27 X 48 IN, CLEAR

## (undated) DEVICE — CATHETER, INFINITI DIAGNOSTIC, 5 FR 100CM 3DRC, WILLIAMS RIGHT OR NO TORQUE

## (undated) DEVICE — CUP, SOLUTION

## (undated) DEVICE — SHEATH, PINNACLE, W/.038 GW 10CM, 5FR INTRODUCER, 2.5 CM DIALATOR

## (undated) DEVICE — SYRINGE, ANGIOGRAPHIC, MULTIDOSE

## (undated) DEVICE — SYRINGE, HYPODERMIC, TB, W/O NEEDLE, 1 CC, SLIP TIP

## (undated) DEVICE — MARKER, SKIN, RULER AND LABEL PACK, CUSTOM

## (undated) DEVICE — DENTITION PROTECTOR, QUICKGUARD, NON-PERF

## (undated) DEVICE — PAD, EYE, OVAL, 1 5/8 X 2 5/8 IN, STERILE

## (undated) DEVICE — SHIELD, EYE, FOX, CONVEX, ALUMINUM, NS